# Patient Record
Sex: FEMALE | Race: OTHER | HISPANIC OR LATINO | Employment: FULL TIME | URBAN - METROPOLITAN AREA
[De-identification: names, ages, dates, MRNs, and addresses within clinical notes are randomized per-mention and may not be internally consistent; named-entity substitution may affect disease eponyms.]

---

## 2022-03-28 RX ORDER — LEVOTHYROXINE AND LIOTHYRONINE 38; 9 UG/1; UG/1
TABLET ORAL
COMMUNITY
Start: 2020-08-27

## 2022-04-06 ENCOUNTER — OFFICE VISIT (OUTPATIENT)
Dept: FAMILY MEDICINE CLINIC | Facility: CLINIC | Age: 42
End: 2022-04-06
Payer: COMMERCIAL

## 2022-04-06 VITALS
BODY MASS INDEX: 39.62 KG/M2 | DIASTOLIC BLOOD PRESSURE: 90 MMHG | WEIGHT: 237.8 LBS | TEMPERATURE: 96.9 F | RESPIRATION RATE: 16 BRPM | OXYGEN SATURATION: 98 % | HEIGHT: 65 IN | SYSTOLIC BLOOD PRESSURE: 116 MMHG | HEART RATE: 85 BPM

## 2022-04-06 DIAGNOSIS — F41.9 ANXIETY: ICD-10-CM

## 2022-04-06 DIAGNOSIS — E06.3 HASHIMOTO'S THYROIDITIS: Primary | ICD-10-CM

## 2022-04-06 DIAGNOSIS — Z12.31 ENCOUNTER FOR SCREENING MAMMOGRAM FOR BREAST CANCER: ICD-10-CM

## 2022-04-06 DIAGNOSIS — L63.9 ALOPECIA AREATA: ICD-10-CM

## 2022-04-06 DIAGNOSIS — R92.8 ABNORMAL MAMMOGRAM: ICD-10-CM

## 2022-04-06 DIAGNOSIS — E55.9 VITAMIN D DEFICIENCY: ICD-10-CM

## 2022-04-06 DIAGNOSIS — N76.0 BV (BACTERIAL VAGINOSIS): ICD-10-CM

## 2022-04-06 DIAGNOSIS — D50.9 IRON DEFICIENCY ANEMIA, UNSPECIFIED IRON DEFICIENCY ANEMIA TYPE: ICD-10-CM

## 2022-04-06 DIAGNOSIS — L21.9 SEBORRHEIC ECZEMA: ICD-10-CM

## 2022-04-06 DIAGNOSIS — B96.89 BV (BACTERIAL VAGINOSIS): ICD-10-CM

## 2022-04-06 PROBLEM — D64.9 ANEMIA: Status: ACTIVE | Noted: 2022-04-06

## 2022-04-06 PROCEDURE — 3008F BODY MASS INDEX DOCD: CPT | Performed by: FAMILY MEDICINE

## 2022-04-06 PROCEDURE — 1036F TOBACCO NON-USER: CPT | Performed by: FAMILY MEDICINE

## 2022-04-06 PROCEDURE — 3725F SCREEN DEPRESSION PERFORMED: CPT | Performed by: FAMILY MEDICINE

## 2022-04-06 PROCEDURE — 99204 OFFICE O/P NEW MOD 45 MIN: CPT | Performed by: FAMILY MEDICINE

## 2022-04-06 RX ORDER — ESTRADIOL 0.1 MG/D
PATCH, EXTENDED RELEASE TRANSDERMAL
COMMUNITY
Start: 2022-01-31

## 2022-04-06 RX ORDER — METRONIDAZOLE 7.5 MG/G
1 GEL VAGINAL AS NEEDED
Qty: 70 G | Refills: 2 | Status: SHIPPED | OUTPATIENT
Start: 2022-04-06

## 2022-04-06 RX ORDER — ESTRADIOL 2 MG/1
2 TABLET ORAL 2 TIMES DAILY
COMMUNITY
Start: 2022-02-16

## 2022-04-06 RX ORDER — SYRINGE-NEEDLE,INSULIN,0.5 ML 28GX1/2"
SYRINGE, EMPTY DISPOSABLE MISCELLANEOUS
COMMUNITY
Start: 2022-03-03

## 2022-04-06 RX ORDER — METRONIDAZOLE 7.5 MG/G
GEL VAGINAL
COMMUNITY
Start: 2022-03-11 | End: 2022-04-06 | Stop reason: SDUPTHER

## 2022-04-06 RX ORDER — FLUCONAZOLE 200 MG/1
TABLET ORAL
COMMUNITY
Start: 2022-02-16

## 2022-04-06 RX ORDER — FOLLITROPIN 975 [IU]/1.17ML
INJECTION, SOLUTION SUBCUTANEOUS
COMMUNITY
Start: 2022-04-04

## 2022-04-06 RX ORDER — MENOTROPINS 75 UNIT
KIT SUBCUTANEOUS
COMMUNITY
Start: 2022-04-04

## 2022-04-06 NOTE — PROGRESS NOTES
Assessment/Plan:       Problem List Items Addressed This Visit        Endocrine    Hashimoto's thyroiditis - Primary     On [place] thyroid  Does respond to the generic   Was seeing endo in [place]  Asked patient to upload records  Last TSH 1 05 in May 2021   Repeat ordered          Relevant Orders    TSH, 3rd generation       Musculoskeletal and Integument    Alopecia areata     Saw derm in the past and received intralesional injection in the past with minimal relief  Improved with stress relief         Seborrheic eczema    Relevant Medications    neomycin-polymyxin-hydrocortisone (CORTISPORIN) otic solution       Genitourinary    BV (bacterial vaginosis)     History of reccurent BV infections  Currently asx   Requesting MetroGel refill for PRN use          Relevant Medications    metroNIDAZOLE (METROGEL) 0 75 % vaginal gel       Other    Anemia    Relevant Orders    Iron Panel (Includes Ferritin, Iron Sat%, Iron, and TIBC)    CBC and differential    Vitamin B12    Vitamin D deficiency    Relevant Orders    Vitamin D 25 hydroxy    Encounter for screening mammogram for breast cancer    Relevant Orders    Mammo screening bilateral w 3d & cad    Abnormal mammogram     Requesting records  Diagnostic mammo and U S ordered   No personal or FH of breast cancer  There is history of colon cancer in a 2nd degree relative         Relevant Orders    US breast left limited (diagnostic)    US breast right limited (diagnostic)    Anxiety     Sees a therapist but would be interested in seeing someone else   Not interested in the pharmacotherapy  Has not tried medications in the past   Mood is stable and denies SI/HI  Prior [facility] admissions  Referred to Goldie Onofre          Relevant Orders    Ambulatory Referral to Behavioral Health            Subjective:      Patient ID: Jackelyn Hisrch is a 39 y o  female here as a new patient  PMH significant for recurrent BV, abnormal pap smear, anxiety, Hashimoto, and alopecia areata   Last PCP was in [facility] and was seen 2021  Here with anxiety, thyroid disease, abnormal mammogram, vitamin deficiency, and ear swelling  Weight gain: gained 30 lbs since last year  Last mammogram showed dense breast and recommended an US  Anxiety: Has been seeing a therapist to address her anxiety and for couples therapy  Currently seeing Anibal Murphy  Hashimoto's: On [place] thyroid  Synthroid didn't help  Was seeing endocrinologist in [facility]  Last TSH 2021 was 1 5  Antithyroglubulin negative and TPO was positive in   Vitamin deficiency: Deficient  in iron and Vit D  Last Hb13 1, MCV 82, plt 283, ferritin 9, iron 52, TIBC 361, iron sat 14, B12 1088,     Labs: Lipids in  chol 180, HDL 67, triglyceride 95, LDL 94    Started fertility journey in January  Undergoing IVF  History of trisomy pregnancy 2 years ago  Has a 16years old son  Left ear swollen on and off for 4 months  No drainage  Dry and itchy  Last pap smear in 2021  Cytology negative and no HPV  The following portions of the patient's history were reviewed and updated as appropriate:   She  has a past medical history of BV (bacterial vaginosis), Hashimoto's disease, Infertility, female, and Vitamin D deficiency  She   Patient Active Problem List    Diagnosis Date Noted    Encounter for screening mammogram for breast cancer 2022    Abnormal mammogram 2022    BV (bacterial vaginosis) 2022    Seborrheic eczema 2022    Anxiety 2022    Alopecia areata 2022    Anemia 2022    Vitamin D deficiency 2022    Hashimoto's thyroiditis 2011     She  has a past surgical history that includes [place] tooth extraction and  section  Her family history is not on file  She  reports that she has never smoked  She has never used smokeless tobacco  She reports current alcohol use  She reports that she does not use drugs    Current Outpatient Medications on File Prior to Visit   Medication Sig    Cholecalciferol (Vitamin D3) 1 25 MG (77886 UT) TABS     Chorionic Gonadotropin (PREGNYL IM) INJECT 25 UNITS SUBCUTANEOUSLY TWO TIMES DAILY    estradiol (ESTRACE) 2 MG tablet Take 2 mg by mouth 2 (two) times a day    fluconazole (DIFLUCAN) 200 mg tablet TAKE 1 TABLET BY MOUTH EVERY 72 HOURS    Follistim  UNT/1 08ML SOLN     Menopur 75 units SOLR     thyroid ([place] Thyroid) 60 MG tablet     TRUEplus Insulin Syringe 28G X 1/2" 0 5 ML MISC USE WITH MICRO LEUPROLIDE    Vivelle-Dot 0 1 MG/24HR      No current facility-administered medications on file prior to visit  She is allergic to almond (diagnostic) - food allergy       Review of Systems   Constitutional: Positive for fatigue  HENT: Positive for ear pain  Negative for ear discharge  Respiratory: Negative for choking, chest tightness and shortness of breath  Musculoskeletal: Negative for neck pain  Hematological: Negative for adenopathy  Psychiatric/Behavioral: Negative for self-injury and suicidal ideas  The patient is nervous/anxious  Objective:      /90   Pulse 85   Temp (!) 96 9 °F (36 1 °C)   Resp 16   Ht 5' 4 5" (1 638 m)   Wt 108 kg (237 lb 12 8 oz)   SpO2 98%   BMI 40 19 kg/m²          Physical Exam  Vitals reviewed  Constitutional:       Appearance: Normal appearance  She is not ill-appearing  HENT:      Head: Normocephalic and atraumatic  Right Ear: Tympanic membrane normal       Ears:      Comments: Dryness and flakes in the left ear canal  Mild redness   Eyes:      Extraocular Movements: Extraocular movements intact  Pupils: Pupils are equal, round, and reactive to light  Cardiovascular:      Rate and Rhythm: Normal rate and regular rhythm  Heart sounds: No murmur heard  Pulmonary:      Effort: Pulmonary effort is normal  No respiratory distress  Breath sounds: Normal breath sounds  No stridor  No wheezing or rhonchi     Abdominal: General: Abdomen is flat  There is no distension  Palpations: Abdomen is soft  There is no mass  Tenderness: There is no abdominal tenderness  Hernia: No hernia is present  Musculoskeletal:      Right lower leg: Edema present  Left lower leg: Edema (trace pretibial edema ) present  Skin:     General: Skin is warm  Coloration: Skin is not jaundiced  Neurological:      General: No focal deficit present  Mental Status: She is alert and oriented to person, place, and time  Psychiatric:         Mood and Affect: Mood normal          Behavior: Behavior normal          Thought Content:  Thought content normal

## 2022-04-07 PROBLEM — F41.9 ANXIETY: Status: ACTIVE | Noted: 2022-04-07

## 2022-04-07 PROBLEM — L21.9 SEBORRHEIC ECZEMA: Status: ACTIVE | Noted: 2022-04-07

## 2022-04-07 PROBLEM — R92.8 ABNORMAL MAMMOGRAM: Status: ACTIVE | Noted: 2022-04-07

## 2022-04-07 PROBLEM — B96.89 BV (BACTERIAL VAGINOSIS): Status: ACTIVE | Noted: 2022-04-07

## 2022-04-07 PROBLEM — Z12.31 ENCOUNTER FOR SCREENING MAMMOGRAM FOR BREAST CANCER: Status: ACTIVE | Noted: 2022-04-07

## 2022-04-07 PROBLEM — N76.0 BV (BACTERIAL VAGINOSIS): Status: ACTIVE | Noted: 2022-04-07

## 2022-04-07 NOTE — ASSESSMENT & PLAN NOTE
Saw derm in the past and received intralesional injection in the past with minimal relief     Improved with stress relief

## 2022-04-07 NOTE — ASSESSMENT & PLAN NOTE
Requesting records  Diagnostic mammo and U S ordered   No personal or FH of breast cancer   There is history of colon cancer in a 2nd degree relative

## 2022-04-07 NOTE — ASSESSMENT & PLAN NOTE
Sees a therapist but would be interested in seeing someone else   Not interested in the pharmacotherapy  Has not tried medications in the past   Mood is stable and denies SI/HI     Prior [facility] admissions  Referred to Bright Figueroa

## 2022-04-07 NOTE — ASSESSMENT & PLAN NOTE
On [place] thyroid  Does respond to the generic   [facility]  Asked patient to upload records     Last TSH 1 05 in May 2021   Repeat ordered

## 2022-05-11 ENCOUNTER — TELEPHONE (OUTPATIENT)
Dept: PSYCHIATRY | Facility: CLINIC | Age: 42
End: 2022-05-11

## 2022-05-11 NOTE — TELEPHONE ENCOUNTER
patient was not interested in scheduling at this time and states she will call when she chooses to do so

## 2022-09-19 ENCOUNTER — OFFICE VISIT (OUTPATIENT)
Dept: ENDOCRINOLOGY | Facility: CLINIC | Age: 42
End: 2022-09-19
Payer: COMMERCIAL

## 2022-09-19 VITALS
HEART RATE: 91 BPM | HEIGHT: 65 IN | WEIGHT: 238.6 LBS | SYSTOLIC BLOOD PRESSURE: 130 MMHG | BODY MASS INDEX: 39.75 KG/M2 | DIASTOLIC BLOOD PRESSURE: 90 MMHG

## 2022-09-19 DIAGNOSIS — E06.3 HASHIMOTO'S THYROIDITIS: ICD-10-CM

## 2022-09-19 DIAGNOSIS — E03.8 HYPOTHYROIDISM DUE TO HASHIMOTO'S THYROIDITIS: Primary | ICD-10-CM

## 2022-09-19 DIAGNOSIS — E04.1 THYROID NODULE: ICD-10-CM

## 2022-09-19 DIAGNOSIS — E55.9 VITAMIN D DEFICIENCY: ICD-10-CM

## 2022-09-19 DIAGNOSIS — Z3A.01 LESS THAN 8 WEEKS GESTATION OF PREGNANCY: ICD-10-CM

## 2022-09-19 DIAGNOSIS — E06.3 HYPOTHYROIDISM DUE TO HASHIMOTO'S THYROIDITIS: Primary | ICD-10-CM

## 2022-09-19 PROCEDURE — 99205 OFFICE O/P NEW HI 60 MIN: CPT | Performed by: INTERNAL MEDICINE

## 2022-09-19 RX ORDER — PROGESTERONE 50 MG/ML
75 INJECTION, SOLUTION INTRAMUSCULAR DAILY
COMMUNITY
Start: 2022-09-16 | End: 2022-10-12

## 2022-09-19 RX ORDER — NORETHINDRONE AND ETHINYL ESTRADIOL 1 MG-35MCG
KIT ORAL
COMMUNITY
Start: 2022-09-10 | End: 2022-09-19

## 2022-09-19 RX ORDER — LEVOTHYROXINE SODIUM 125 MCG
125 TABLET ORAL DAILY
Qty: 30 TABLET | Refills: 3 | Status: SHIPPED | OUTPATIENT
Start: 2022-09-19

## 2022-09-19 RX ORDER — ESTRADIOL 0.1 MG/G
2 CREAM VAGINAL DAILY
COMMUNITY
End: 2022-10-12

## 2022-09-19 NOTE — PROGRESS NOTES
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     TSH is optimal however would not suggest using [place] in pregnancy as T3 does not cross the placenta-fetal thyroid is not formed in the 1st trimester so relies on maternal T4  For now discontinue on no and start Synthroid 125 mcg daily  Repeat TSH and free T4 in 1 month  Relevant Medications    Synthroid 125 MCG tablet    Other Relevant Orders    T4, free Lab Collect    TSH, 3rd generation Lab Collect    Thyroid nodule     She gives history of having a thyroid nodule-did not require biopsy  Consider repeat imaging in the future         Relevant Medications    Synthroid 125 MCG tablet       Other    Less than 8 weeks gestation of pregnancy    Vitamin D deficiency     She is on Drisdol-suggest discussing with Dr Tami Menendez about continuing that versus changing to vitamin D3               CC:   Hypothyroidism    History of Present Illness     HPI:  26-year-old female referred here for evaluation of hypothyroidism  She has history of hypothyroidism due to Hashimoto thyroiditis and has been on armour 60 mg daily for 3 years     She is currently 6 weeks pregnant and  Underwent  IVF to achieve pregnancy  Menstrual cycles regular  prior to IVF  She had miscarriage in 2019 , normal healthy pregnancy at age 25   Trisomy 25 26    Takes Sutton regularly and properly - prenatal in afternoon     Feels well - had some bleeding Thursday and will be undergoing another ultrasound today     Some nausea , no vomiting , no fatigue     History of Hypothyroidism diagnosed in 2013 - initially on synthroid however switched to armour after 3 years - had symptoms of hair loss, fatigue while taking Synthroid felt better on Sutton   Weight gain - 25 lbs in the past year   No constipation     History of vitamin-D deficiency for which she is on Drisdol weekly     she works as   with Dr Lise Medley   Review of Systems    Historical Information   Past Medical History:   Diagnosis Date    BV (bacterial vaginosis)     Hashimoto's disease     Infertility, female     Vitamin D deficiency      Past Surgical History:   Procedure Laterality Date     SECTION      WISDOM TOOTH EXTRACTION       Social History   Social History     Substance and Sexual Activity   Alcohol Use Yes    Comment: socially     Social History     Substance and Sexual Activity   Drug Use Never     Social History     Tobacco Use   Smoking Status Never Smoker   Smokeless Tobacco Never Used     Family History:   Family History   Problem Relation Age of Onset    Thyroid disease unspecified Mother     Hyperlipidemia Father     Hypertension Father     Diabetes type II Paternal Grandfather        Meds/Allergies   Current Outpatient Medications   Medication Sig Dispense Refill    Cholecalciferol (Vitamin D3) 1 25 MG (41513 UT) TABS once a week      estradiol (ESTRACE VAGINAL) 0 1 mg/g vaginal cream Insert 2 g into the vagina daily      estradiol (ESTRACE) 2 MG tablet Take 2 mg by mouth 3 (three) times a day      fluconazole (DIFLUCAN) 200 mg tablet TAKE 1 TABLET BY MOUTH EVERY 72 HOURS      metroNIDAZOLE (METROGEL) 0 75 % vaginal gel Insert 1 application into the vagina as needed (discharge) use once daily during flares 70 g 2    neomycin-polymyxin-hydrocortisone (CORTISPORIN) otic solution Administer 3 drops into the left ear every 8 (eight) hours 10 mL 0    Prenatal Vit-Fe Fumarate-FA (PRENATAL VITAMIN PO) Take by mouth      progesterone 50 mg/mL injection 75 mg daily      Synthroid 125 MCG tablet Take 1 tablet (125 mcg total) by mouth daily 30 tablet 3    TRUEplus Insulin Syringe 28G X 1/2" 0 5 ML MISC USE WITH MICRO LEUPROLIDE       No current facility-administered medications for this visit  Allergies   Allergen Reactions    [place] (Diagnostic) - Food Allergy Itching and Other (See Comments)       Objective   Vitals: Blood pressure 130/90, pulse 91, height 5' 4 5" (1 638 m), weight 108 kg (238 lb 9 6 oz)  Physical Exam  Vitals reviewed  Constitutional:       Appearance: Normal appearance  She is obese  She is not ill-appearing or diaphoretic  HENT:      Head: Normocephalic and atraumatic  Eyes:      General: No scleral icterus  Extraocular Movements: Extraocular movements intact  Cardiovascular:      Rate and Rhythm: Normal rate and regular rhythm  Heart sounds: Normal heart sounds  No murmur heard  Pulmonary:      Effort: Pulmonary effort is normal  No respiratory distress  Breath sounds: Normal breath sounds  No wheezing  Abdominal:      General: There is no distension  Palpations: Abdomen is soft  Tenderness: There is no abdominal tenderness  There is no guarding  Musculoskeletal:      Cervical back: Neck supple  Right lower leg: No edema  Left lower leg: No edema  Lymphadenopathy:      Cervical: No cervical adenopathy  Skin:     General: Skin is warm and dry  Neurological:      General: No focal deficit present  Mental Status: She is alert and oriented to person, place, and time  Psychiatric:         Mood and Affect: Mood normal          Behavior: Behavior normal          Thought Content: Thought content normal          Judgment: Judgment normal          The history was obtained from the review of the chart, patient  Lab Results:       9/13/2022   tsh 1 78    Imaging Studies:     Portions of the record may have been created with voice recognition software  Occasional wrong word or "sound a like" substitutions may have occurred due to the inherent limitations of voice recognition software  Read the chart carefully and recognize, using context, where substitutions have occurred

## 2022-09-19 NOTE — ASSESSMENT & PLAN NOTE
She is on Drisdol-suggest discussing with Dr Trevin Edmonds about continuing that versus changing to vitamin D3

## 2022-09-19 NOTE — LETTER
September 19, 2022     Kristel Bennett MD  [address] [place] [id number]    Patient: Franco Le   YOB: 1980   Date of Visit: 9/19/2022       Dear Dr Jefferson Piedra:    Thank you for referring Franco Le to me for evaluation  Below are my notes for this consultation  If you have questions, please do not hesitate to call me  I look forward to following your patient along with you           Sincerely,        Tomasa Hamlin MD        CC: DO Tomasa Turcios MD  9/19/2022  9:29 AM  Incomplete   Franco Le 43 y o  female MRN: [id number]    Encounter: [id number]      Assessment/Plan     Problem List Items Addressed This Visit        Endocrine    Hashimoto's thyroiditis    Relevant Medications    Synthroid 125 MCG tablet    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    Relevant Medications    Synthroid 125 MCG tablet    Other Relevant Orders    T4, free Lab Collect    TSH, 3rd generation Lab Collect    Thyroid nodule    Relevant Medications    Synthroid 125 MCG tablet       Other    Less than 8 weeks gestation of pregnancy    Vitamin D deficiency          CC:   ***    History of Present Illness     HPI:    6 weeks pregnant Underwent  IVF   Menstrual cycles regular   miscarriage in 2019 , normal healthy pregnancy at age 25   Trisomy 25 2020   armour 60 mg daily for 3 years   Takes regularly and properly - prenatal in afternoon     Feels well - had some bleeding Thursday and will be undergoing another ultrasound today    with Dr Annalisa Jim   Some nausea , no vomiting , no fatigue     Hypothyroidism - 2013 - initially on synthroid however switched to armour after 3 years - had symptoms of hair loss, fatigue   Weight gain - 25 lbs in the past year   No constipation     DRISDOL weekly -       Review of Systems    Historical Information   Past Medical History:   Diagnosis Date    BV (bacterial vaginosis)     Hashimoto's disease     Infertility, female     Vitamin D deficiency      Past Surgical History:   Procedure Laterality Date     SECTION      WISDOM TOOTH EXTRACTION       Social History   Social History     Substance and Sexual Activity   Alcohol Use Yes    Comment: socially     Social History     Substance and Sexual Activity   Drug Use Never     Social History     Tobacco Use   Smoking Status Never Smoker   Smokeless Tobacco Never Used     Family History:   Family History   Problem Relation Age of Onset    Thyroid disease unspecified Mother     Hyperlipidemia Father     Hypertension Father     Diabetes type II Paternal Grandfather        Meds/Allergies   Current Outpatient Medications   Medication Sig Dispense Refill    Cholecalciferol (Vitamin D3) 1 25 MG (26660 UT) TABS once a week      estradiol (ESTRACE VAGINAL) 0 1 mg/g vaginal cream Insert 2 g into the vagina daily      estradiol (ESTRACE) 2 MG tablet Take 2 mg by mouth 3 (three) times a day      fluconazole (DIFLUCAN) 200 mg tablet TAKE 1 TABLET BY MOUTH EVERY 72 HOURS      metroNIDAZOLE (METROGEL) 0 75 % vaginal gel Insert 1 application into the vagina as needed (discharge) use once daily during flares 70 g 2    neomycin-polymyxin-hydrocortisone (CORTISPORIN) otic solution Administer 3 drops into the left ear every 8 (eight) hours 10 mL 0    Prenatal Vit-Fe Fumarate-FA (PRENATAL VITAMIN PO) Take by mouth      progesterone 50 mg/mL injection 75 mg daily      Synthroid 125 MCG tablet Take 1 tablet (125 mcg total) by mouth daily 30 tablet 3    TRUEplus Insulin Syringe 28G X 1/2" 0 5 ML MISC USE WITH MICRO LEUPROLIDE       No current facility-administered medications for this visit  Allergies   Allergen Reactions    [place] (Diagnostic) - Food Allergy Itching and Other (See Comments)       Objective   Vitals: Blood pressure 130/90, pulse 91, height 5' 4 5" (1 638 m), weight 108 kg (238 lb 9 6 oz)      Physical Exam    The history was obtained from the review of the chart, {HISTORY OBTAINED FROM:[id number]}  Lab Results:       9/13/2022   ths 1 78    Imaging Studies:       {Results Review Statement:43944}    Portions of the record may have been created with voice recognition software  Occasional wrong word or "sound a like" substitutions may have occurred due to the inherent limitations of voice recognition software  Read the chart carefully and recognize, using context, where substitutions have occurred

## 2022-09-19 NOTE — ASSESSMENT & PLAN NOTE
TSH is optimal however would not suggest using [place] in pregnancy as T3 does not cross the placenta-fetal thyroid is not formed in the 1st trimester so relies on maternal T4  For now discontinue on no and start Synthroid 125 mcg daily  Repeat TSH and free T4 in 1 month

## 2022-09-19 NOTE — LETTER
September 19, 2022     Anastasia Peralta MD  [address] [place] [id number]    Patient: Stewart Gruber   YOB: 1980   Date of Visit: 9/19/2022       Dear Dr Nadia Jensen:    Thank you for referring Stewart Coffee to me for evaluation  Below are my notes for this consultation  If you have questions, please do not hesitate to call me  I look forward to following your patient along with you  Sincerely,        Toñito Ramirez MD        CC: DO Toñito Beyer MD  9/19/2022  9:49 AM  Sign when Signing Visit   Stewart Gruber 43 y o  female MRN: [id number]    Encounter: [id number]      Assessment/Plan     Problem List Items Addressed This Visit        Endocrine    Hashimoto's thyroiditis    Relevant Medications    Synthroid 125 MCG tablet    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     TSH is optimal however would not suggest using [place] in pregnancy as T3 does not cross the placenta-fetal thyroid is not formed in the 1st trimester so relies on maternal T4  For now discontinue on no and start Synthroid 125 mcg daily  Repeat TSH and free T4 in 1 month  Relevant Medications    Synthroid 125 MCG tablet    Other Relevant Orders    T4, free Lab Collect    TSH, 3rd generation Lab Collect    Thyroid nodule     She gives history of having a thyroid nodule-did not require biopsy  Consider repeat imaging in the future         Relevant Medications    Synthroid 125 MCG tablet       Other    Less than 8 weeks gestation of pregnancy    Vitamin D deficiency     She is on Drisdol-suggest discussing with Dr Gaye Chamorro about continuing that versus changing to vitamin D3               CC:   Hypothyroidism    History of Present Illness     HPI:  27-year-old female referred here for evaluation of hypothyroidism  She has history of hypothyroidism due to Hashimoto thyroiditis and has been on armour 60 mg daily for 3 years     She is currently 6 weeks pregnant and Underwent  IVF to achieve pregnancy  Menstrual cycles regular  prior to IVF  She had miscarriage in 2019 , normal healthy pregnancy at age 25   Trisomy 25 26    Takes Vassar regularly and properly - prenatal in afternoon     Feels well - had some bleeding Thursday and will be undergoing another ultrasound today     Some nausea , no vomiting , no fatigue     History of Hypothyroidism diagnosed in  - initially on synthroid however switched to armour after 3 years - had symptoms of hair loss, fatigue while taking Synthroid felt better on Vassar   Weight gain - 25 lbs in the past year   No constipation     History of vitamin-D deficiency for which she is on Drisdol weekly     she works as   with Dr Raciel Lemus   Review of Systems    Historical Information   Past Medical History:   Diagnosis Date    BV (bacterial vaginosis)     Hashimoto's disease     Infertility, female     Vitamin D deficiency      Past Surgical History:   Procedure Laterality Date     SECTION      WISDOM TOOTH EXTRACTION       Social History   Social History     Substance and Sexual Activity   Alcohol Use Yes    Comment: socially     Social History     Substance and Sexual Activity   Drug Use Never     Social History     Tobacco Use   Smoking Status Never Smoker   Smokeless Tobacco Never Used     Family History:   Family History   Problem Relation Age of Onset    Thyroid disease unspecified Mother     Hyperlipidemia Father     Hypertension Father     Diabetes type II Paternal Grandfather        Meds/Allergies   Current Outpatient Medications   Medication Sig Dispense Refill    Cholecalciferol (Vitamin D3) 1 25 MG (69431 UT) TABS once a week      estradiol (ESTRACE VAGINAL) 0 1 mg/g vaginal cream Insert 2 g into the vagina daily      estradiol (ESTRACE) 2 MG tablet Take 2 mg by mouth 3 (three) times a day      fluconazole (DIFLUCAN) 200 mg tablet TAKE 1 TABLET BY MOUTH EVERY 72 HOURS      metroNIDAZOLE (METROGEL) 0 75 % vaginal gel Insert 1 application into the vagina as needed (discharge) use once daily during flares 70 g 2    neomycin-polymyxin-hydrocortisone (CORTISPORIN) otic solution Administer 3 drops into the left ear every 8 (eight) hours 10 mL 0    Prenatal Vit-Fe Fumarate-FA (PRENATAL VITAMIN PO) Take by mouth      progesterone 50 mg/mL injection 75 mg daily      Synthroid 125 MCG tablet Take 1 tablet (125 mcg total) by mouth daily 30 tablet 3    TRUEplus Insulin Syringe 28G X 1/2" 0 5 ML MISC USE WITH MICRO LEUPROLIDE       No current facility-administered medications for this visit  Allergies   Allergen Reactions    [place] ([facility]) - Food Allergy Itching and Other (See Comments)       Objective   Vitals: Blood pressure 130/90, pulse 91, height 5' 4 5" (1 638 m), weight 108 kg (238 lb 9 6 oz)  Physical Exam  Vitals reviewed  Constitutional:       Appearance: Normal appearance  She is obese  She is not ill-appearing or diaphoretic  HENT:      Head: Normocephalic and atraumatic  Eyes:      General: No scleral icterus  Extraocular Movements: Extraocular movements intact  Cardiovascular:      Rate and Rhythm: Normal rate and regular rhythm  Heart sounds: Normal heart sounds  No murmur heard  Pulmonary:      Effort: Pulmonary effort is normal  No respiratory distress  Breath sounds: Normal breath sounds  No wheezing  Abdominal:      General: There is no distension  Palpations: Abdomen is soft  Tenderness: There is no abdominal tenderness  There is no guarding  Musculoskeletal:      Cervical back: Neck supple  Right lower leg: No edema  Left lower leg: No edema  Lymphadenopathy:      Cervical: No cervical adenopathy  Skin:     General: Skin is warm and dry  Neurological:      General: No focal deficit present  Mental Status: She is alert and oriented to person, place, and time     Psychiatric:         Mood and Affect: Mood normal  Behavior: Behavior normal          Thought Content: Thought content normal          Judgment: Judgment normal          The history was obtained from the review of the chart, patient  Lab Results:       9/13/2022   tsh 1 78    Imaging Studies:     Portions of the record may have been created with voice recognition software  Occasional wrong word or "sound a like" substitutions may have occurred due to the inherent limitations of voice recognition software  Read the chart carefully and recognize, using context, where substitutions have occurred

## 2022-09-19 NOTE — ASSESSMENT & PLAN NOTE
She gives history of having a thyroid nodule-did not require biopsy    Consider repeat imaging in the future

## 2022-10-12 ENCOUNTER — TELEPHONE (OUTPATIENT)
Dept: ADMINISTRATIVE | Facility: OTHER | Age: 42
End: 2022-10-12

## 2022-10-12 ENCOUNTER — OFFICE VISIT (OUTPATIENT)
Dept: FAMILY MEDICINE CLINIC | Facility: CLINIC | Age: 42
End: 2022-10-12
Payer: COMMERCIAL

## 2022-10-12 VITALS
DIASTOLIC BLOOD PRESSURE: 78 MMHG | SYSTOLIC BLOOD PRESSURE: 110 MMHG | HEIGHT: 64 IN | TEMPERATURE: 96.3 F | WEIGHT: 233.8 LBS | RESPIRATION RATE: 16 BRPM | BODY MASS INDEX: 39.91 KG/M2 | OXYGEN SATURATION: 99 % | HEART RATE: 85 BPM

## 2022-10-12 DIAGNOSIS — Z11.4 SCREENING FOR HIV (HUMAN IMMUNODEFICIENCY VIRUS): ICD-10-CM

## 2022-10-12 DIAGNOSIS — Z00.00 ENCOUNTER FOR ANNUAL PHYSICAL EXAM: Primary | ICD-10-CM

## 2022-10-12 DIAGNOSIS — N97.9 INFERTILITY, FEMALE: ICD-10-CM

## 2022-10-12 DIAGNOSIS — J02.9 SORE THROAT: ICD-10-CM

## 2022-10-12 DIAGNOSIS — J06.9 VIRAL UPPER RESPIRATORY TRACT INFECTION: ICD-10-CM

## 2022-10-12 DIAGNOSIS — Z11.59 NEED FOR HEPATITIS C SCREENING TEST: ICD-10-CM

## 2022-10-12 LAB — S PYO AG THROAT QL: NEGATIVE

## 2022-10-12 PROCEDURE — 87880 STREP A ASSAY W/OPTIC: CPT | Performed by: FAMILY MEDICINE

## 2022-10-12 PROCEDURE — 99396 PREV VISIT EST AGE 40-64: CPT | Performed by: FAMILY MEDICINE

## 2022-10-12 PROCEDURE — 87070 CULTURE OTHR SPECIMN AEROBIC: CPT | Performed by: FAMILY MEDICINE

## 2022-10-12 NOTE — TELEPHONE ENCOUNTER
----- Message from Paige Simms sent at 10/12/2022  9:46 AM EDT -----  Regarding: care gap request/Pap smear  10/12/22 9:46 AM    Hello, our patient attached above has had Pap Smear (HPV) aka Cervical Cancer Screening completed/performed  Please assist in updating the patient chart by making an External outreach to [facility] facility located in [address] [place] [address] telephone number [phone]  The date of service is [id number]      Thank you,  Paige Simms  [facility] [place] [facility]

## 2022-10-12 NOTE — ASSESSMENT & PLAN NOTE
Here for AWV  Due for pap which she will schedule in dec  Plans to get the flu shot with her employer  Will defer Dtap  Started seeing a nutritionist  Encourage exercise   Plans to schedule nitesh and complete blood work

## 2022-10-12 NOTE — LETTER
Procedure Request Form: Cervical Cancer Screening      Date Requested: 10/12/22  Patient: Stewart Gruber  Patient : 1980   Referring Provider: Anastasia Peralta MD        Date of Procedure _______2019-2021 report_______________________       The above patient has informed us that they have completed their   most recent Cervical Cancer Screening at your facility  Please complete   this form and attach all corresponding procedure reports/results  Comments __________________________________________________________  ____________________________________________________________________  ____________________________________________________________________  ____________________________________________________________________    Facility Completing Procedure _________________________________________    Form Completed By (print name) _______________________________________      Signature __________________________________________________________      These reports are needed for  compliance  Please fax this completed form and a copy of the procedure report to our office located at [place] [address] as soon as possible to [phone] attention Anna Hebert: Phone [phone]    We thank you for your assistance in treating our mutual patient

## 2022-10-12 NOTE — ASSESSMENT & PLAN NOTE
Undergoing IVF at [facility]  Unfortunately miscarried  Plans to do another cycle  Will screen for HIV, GC, hep C, and RPR

## 2022-10-12 NOTE — TELEPHONE ENCOUNTER
Upon review of the In Basket request and the patient's chart, initial outreach has been made via fax, please see Contacts section for details       Thank you  Shahzad Das

## 2022-10-12 NOTE — PROGRESS NOTES
[facility]    NAME: Chasidy Lane  AGE: 43 y o  SEX: female  : 1980     DATE: 10/12/2022     Assessment and Plan:     Problem List Items Addressed This Visit        Respiratory    Viral upper respiratory tract infection     URI symptoms x 6 days   Tested negative for COVID today   Strept RADT negative  Throat cx ordered   Supportive care           Relevant Orders    POCT rapid strepA       Genitourinary    Infertility, female     Undergoing IVF at [facility]  Unfortunately miscarried  Plans to do another cycle  Will screen for HIV, GC, hep C, and RPR         Relevant Orders    RPR    Chlamydia/GC amplified DNA by PCR       Other    Need for hepatitis C screening test    Relevant Orders    Hepatitis C Antibody ([facility])    Screening for HIV (human immunodeficiency virus)    Relevant Orders    HIV 1/2 Antigen/Antibody (4th Generation) w Reflex [facility]    Encounter for annual physical exam - Primary     Here for AWV  Due for pap which she will schedule in dec  Plans to get the flu shot with her employer  Will defer Dtap  Started seeing a nutritionist  Encourage exercise  Plans to schedule nitesh and complete blood work            Other Visit Diagnoses     Sore throat        Relevant Orders    Throat culture          Immunizations and preventive care screenings were discussed with patient today  Appropriate education was printed on patient's after visit summary  Counseling:  Exercise: the importance of regular exercise/physical activity was discussed  Recommend exercise 3-5 times per week for at least 30 minutes  BMI Counseling: Body mass index is 40 13 kg/m²   The BMI is above normal  Nutrition recommendations include encouraging healthy choices of fruits and vegetables, decreasing fast food intake, limiting drinks that contain sugar, moderation in carbohydrate intake, increasing intake of lean protein, reducing intake of saturated and trans fat and reducing intake of cholesterol  Exercise recommendations include moderate physical activity 150 minutes/week  No pharmacotherapy was ordered  Rationale for BMI follow-up plan is due to patient being overweight or obese  Depression Screening and Follow-up Plan: Patient was screened for depression during today's encounter  They screened negative with a PHQ-2 score of 2  No follow-ups on file  Chief Complaint:     Chief Complaint   Patient presents with   • Physical Exam     Patient is here today for her annual exam       History of Present Illness:     Adult Annual Physical   Patient here for a comprehensive physical exam  The patient reports URI symptoms  Friday she woke up with a headache  Saturday she had sore throat  Sunday patient developed body ache  Monday had fever and chills  Did a home test this morning and came back negative  Unfortunately, patient miscarried after her last IVF cycle  May try another cycle  Met with endocrine and switched armour to synthroid  Diet and Physical Activity  Diet/Nutrition: Started seeing a nutritionist through [place]  Eatimg more protein rich foods, more vegetable, and less carbs   Exercise: no formal exercise  Depression Screening  PHQ-2/9 Depression Screening    Little interest or pleasure in doing things: 1 - several days  Feeling down, depressed, or hopeless: 1 - several days  PHQ-2 Score: 2  PHQ-2 Interpretation: Negative depression screen       General Health  Sleep: sleeps well  Hearing: normal - bilateral   Vision: no vision problems  Dental: regular dental visits and was seen last december 2021  /GYN Health  Patient is: premenopausal  Last menstrual period: 7/29/22  Contraceptive method: currently undergoing IVF   Review of Systems:     Review of Systems   Constitutional: Positive for fever (subjective )  HENT: Positive for congestion and sore throat  Respiratory: Negative  Cardiovascular: Negative  Neurological: Positive for headaches        Past Medical History:     Past Medical History:   Diagnosis Date   • BV (bacterial vaginosis)    • Hashimoto's disease    • Infertility, female    • Vitamin D deficiency       Past Surgical History:     Past Surgical History:   Procedure Laterality Date   •  SECTION     • WISDOM TOOTH EXTRACTION        Social History:     Social History     Socioeconomic History   • Marital status: Registered Domestic Partner     Spouse name: None   • Number of children: None   • Years of education: None   • Highest education level: None   Occupational History   • None   Tobacco Use   • Smoking status: Never Smoker   • Smokeless tobacco: Never Used   Substance and Sexual Activity   • Alcohol use: Yes     Comment: socially   • Drug use: Never   • Sexual activity: Yes     Partners: Male   Other Topics Concern   • None   Social History Narrative   • None     Social Determinants of Health     Financial Resource Strain: Not on file   Food Insecurity: Not on file   Transportation Needs: Not on file   Physical Activity: Not on file   Stress: Not on file   Social Connections: Not on file   Intimate Partner Violence: Not on file   Housing Stability: Not on file      Family History:     Family History   Problem Relation Age of Onset   • Thyroid disease unspecified Mother    • Hyperlipidemia Father    • Hypertension Father    • Diabetes type II Paternal Grandfather       Current Medications:     Current Outpatient Medications   Medication Sig Dispense Refill   • Cholecalciferol (Vitamin D3) 1 25 MG (16695 UT) TABS once a week     • fluconazole (DIFLUCAN) 200 mg tablet Take by mouth if needed     • metroNIDAZOLE (METROGEL) 0 75 % vaginal gel Insert 1 application into the vagina as needed (discharge) use once daily during flares 70 g 2   • neomycin-polymyxin-hydrocortisone (CORTISPORIN) otic solution Administer 3 drops into the left ear every 8 (eight) hours (Patient taking differently: Administer 3 drops into the left ear if needed) 10 mL 0   • Synthroid 125 MCG tablet Take 1 tablet (125 mcg total) by mouth daily 30 tablet 3     No current facility-administered medications for this visit  Allergies: Allergies   Allergen Reactions   • [place] (Diagn[facility]) - Food Allergy Itching and Other (See Comments)      Physical Exam:     /78 (BP Location: Left arm, Patient Position: Sitting, Cuff Size: Large)   Pulse 85   Temp (!) 96 3 °F (35 7 °C) (Tympanic)   Resp 16   Ht 5' 4" (1 626 m)   Wt 106 kg (233 lb 12 8 oz)   SpO2 99%   BMI 40 13 kg/m²     Physical Exam  Vitals reviewed  Constitutional:       General: She is not in acute distress  Appearance: Normal appearance  She is not ill-appearing  HENT:      Head: Normocephalic and atraumatic  Right Ear: Tympanic membrane normal       Left Ear: Tympanic membrane normal       Mouth/Throat:      Mouth: Mucous membranes are moist       Pharynx: Posterior oropharyngeal erythema present  No oropharyngeal exudate  Eyes:      Extraocular Movements: Extraocular movements intact  Cardiovascular:      Rate and Rhythm: Normal rate  Heart sounds: No murmur heard  Pulmonary:      Effort: Pulmonary effort is normal  No respiratory distress  Breath sounds: Normal breath sounds  Abdominal:      General: Abdomen is flat  There is no distension  Palpations: Abdomen is soft  There is no mass  Tenderness: There is no abdominal tenderness  There is no guarding or rebound  Hernia: No hernia is present  Lymphadenopathy:      Cervical: No cervical adenopathy  Skin:     General: Skin is warm  Neurological:      Mental Status: She is alert and oriented to person, place, and time     Psychiatric:         Mood and Affect: Mood normal          Behavior: Behavior normal           Julio Cesar Martini MD  [facility]

## 2022-10-12 NOTE — ASSESSMENT & PLAN NOTE
URI symptoms x 6 days   Tested negative for COVID today   Strept RADT negative  Throat cx ordered   Supportive care

## 2022-10-14 LAB — BACTERIA THROAT CULT: NORMAL

## 2022-10-17 ENCOUNTER — TELEPHONE (OUTPATIENT)
Dept: MAMMOGRAPHY | Facility: CLINIC | Age: 42
End: 2022-10-17

## 2022-10-17 NOTE — TELEPHONE ENCOUNTER
I telephoned Desirae Jones to inform her that we have not received her prior breast imaging from [place]  She stated that she had been recommended further imaging from her screening mammogram in July 2021  She agreed to call facility to try to expedite a disc coming to [facility]  I explained that disc will then be reviewed by our radiologist and we will call her to schedule appt for recommended imaging at [facility]  She verbalized understanding

## 2022-10-17 NOTE — TELEPHONE ENCOUNTER
Upon review of the In Basket request we were able to locate, review, and update the patient chart as requested for Pap Smear (HPV) aka Cervical Cancer Screening  Any additional questions or concerns should be emailed to the Practice Liaisons via the appropriate education email address, please do not reply via In Basket      Thank you  Asif Reyes

## 2022-10-18 ENCOUNTER — APPOINTMENT (OUTPATIENT)
Dept: LAB | Facility: CLINIC | Age: 42
End: 2022-10-18
Payer: COMMERCIAL

## 2022-10-18 DIAGNOSIS — N97.9 INFERTILITY, FEMALE: ICD-10-CM

## 2022-10-18 DIAGNOSIS — Z11.59 NEED FOR HEPATITIS C SCREENING TEST: ICD-10-CM

## 2022-10-18 DIAGNOSIS — Z11.4 SCREENING FOR HIV (HUMAN IMMUNODEFICIENCY VIRUS): ICD-10-CM

## 2022-10-18 PROCEDURE — 87491 CHLMYD TRACH DNA AMP PROBE: CPT

## 2022-10-18 PROCEDURE — 87389 HIV-1 AG W/HIV-1&-2 AB AG IA: CPT

## 2022-10-18 PROCEDURE — 86803 HEPATITIS C AB TEST: CPT

## 2022-10-18 PROCEDURE — 87591 N.GONORRHOEAE DNA AMP PROB: CPT

## 2022-10-18 PROCEDURE — 86592 SYPHILIS TEST NON-TREP QUAL: CPT

## 2022-10-18 PROCEDURE — 36415 COLL VENOUS BLD VENIPUNCTURE: CPT

## 2022-10-19 ENCOUNTER — TELEPHONE (OUTPATIENT)
Dept: FAMILY MEDICINE CLINIC | Facility: CLINIC | Age: 42
End: 2022-10-19

## 2022-10-19 LAB
C TRACH DNA SPEC QL NAA+PROBE: NEGATIVE
HCV AB SER QL: NORMAL
HIV 1+2 AB+HIV1 P24 AG SERPL QL IA: NORMAL
N GONORRHOEA DNA SPEC QL NAA+PROBE: NEGATIVE
RPR SER QL: NORMAL

## 2022-10-19 NOTE — TELEPHONE ENCOUNTER
Called [phone] was left on hold and could not get anyone on the phone  Reviewed the chart but we do not have the authority to request any medical information as no consent was signed   Patient has been advised via my chart

## 2022-12-07 ENCOUNTER — ANNUAL EXAM (OUTPATIENT)
Dept: FAMILY MEDICINE CLINIC | Facility: CLINIC | Age: 42
End: 2022-12-07

## 2022-12-07 VITALS
HEART RATE: 83 BPM | WEIGHT: 233.4 LBS | TEMPERATURE: 97.1 F | SYSTOLIC BLOOD PRESSURE: 120 MMHG | RESPIRATION RATE: 16 BRPM | HEIGHT: 64 IN | OXYGEN SATURATION: 97 % | DIASTOLIC BLOOD PRESSURE: 80 MMHG | BODY MASS INDEX: 39.85 KG/M2

## 2022-12-07 DIAGNOSIS — N76.0 BACTERIAL VAGINOSIS: ICD-10-CM

## 2022-12-07 DIAGNOSIS — B96.89 BACTERIAL VAGINOSIS: ICD-10-CM

## 2022-12-07 DIAGNOSIS — Z12.4 PAP SMEAR FOR CERVICAL CANCER SCREENING: Primary | ICD-10-CM

## 2022-12-07 DIAGNOSIS — B37.49 CANDIDIASIS OF GENITALIA: ICD-10-CM

## 2022-12-07 RX ORDER — FLUCONAZOLE 150 MG/1
150 TABLET ORAL ONCE
Qty: 1 TABLET | Refills: 1 | Status: SHIPPED | OUTPATIENT
Start: 2022-12-07 | End: 2022-12-07

## 2022-12-07 NOTE — PROGRESS NOTES
Annual GYN Examination  Hayden Noland  1980    Subjective      Hayden Noland is a 43 y o  female who presents for annual well woman exam  History of recurrent yeast infections    GYN:  Reports vaginal discharge, labial erythema or lesions, dyspareunia  Menarche at 5  Menses are regular, q 28 days, lasting 5 days  periods are regular  no unusual pelvic pain  has a history of previous abnormal Pap test ( HPV + 2017)  no family or personal history of cervical cancer  Contraception: none  Patient is sexually active with male partner  Gynecologic surgeries: Reports     OB:  [id number]  Pregnancies were complicated by pre-eclampsia  :  Denies dysuria, urinary frequency or urgency  Breast:  Denies breast mass, skin changes, dimpling, reddening, nipple retraction  Denies breast discharge  Patient does not have a family history of breast, endometrial, or ovarian cancer       Screening:  Cervical cancer: Last pap done 2021  Breast cancer: Ordered at the last visit but hasnt been done  Colon cancer: not indicated   STD screening: Very low risk of STD exposure and did a STI panel a few months ago   Last pap smear 2021       Review of Systems  Pertinent items are noted in HPI  Objective      /80 (BP Location: Left arm, Patient Position: Sitting, Cuff Size: Large)   Pulse 83   Temp (!) 97 1 °F (36 2 °C) (Skin)   Resp 16   Ht 5' 4" (1 626 m)   Wt 106 kg (233 lb 6 4 oz)   SpO2 97%   BMI 40 06 kg/m²     General:   alert, appears stated age and cooperative       Lungs: nl effort    Breasts: normal appearance, mass felt at the 3 o clock position of the right breast    Vulva: normal   Vagina: curdlike discharge, erythema around the labia                Assessment/Plan:     Diagnoses and all orders for this visit:    Pap smear for cervical cancer screening  Comments:  UTD for pap  Last one was done in 2021  Will care gap     Orders:  -     Cancel: Liquid-based pap, screening  -     Molecular Vaginal Panel; Future    Bacterial vaginosis  Comments:  Recurrent BV and yeast infection  Precipitated by sexual intercourse or menses  Molecular panel ordered  May need suppression tx  Rec boric acid supp for now  Orders:  -     Molecular Vaginal Panel; Future    Candidiasis of genitalia  -     fluconazole (DIFLUCAN) 150 mg tablet; Take 1 tablet (150 mg total) by mouth once for 1 dose      -      UTD for pap, done 7/2021  -      STI Screening: results reviewed by patient   -      GC/chlamydia swab collected today? No   -      Mammogram already ordered   -      Colonoscopy ordered today: Not Indicated  -      Health maintenance counseling performed on the following topics:   -      Regular exercise (at least 150 minutes of moderate aerobic activity per week or 75 minutes of vigorous aerobic activity per week) for CV & bone health    -      Diet well balanced diet to maintain a healthy weight  -      All questions have been answered to her satisfaction      CLEMENT Chicas

## 2022-12-08 LAB
C GLABRATA DNA VAG QL NAA+PROBE: NEGATIVE
C KRUSEI DNA VAG QL NAA+PROBE: NEGATIVE
CANDIDA SP 6 PNL VAG NAA+PROBE: POSITIVE
T VAGINALIS DNA VAG QL NAA+PROBE: NEGATIVE
VAGINOSIS/ITIS DNA PNL VAG PROBE+SIG AMP: NEGATIVE

## 2022-12-11 PROBLEM — J06.9 VIRAL UPPER RESPIRATORY TRACT INFECTION: Status: RESOLVED | Noted: 2022-10-12 | Resolved: 2022-12-11

## 2022-12-11 PROBLEM — Z11.59 NEED FOR HEPATITIS C SCREENING TEST: Status: RESOLVED | Noted: 2022-10-12 | Resolved: 2022-12-11

## 2023-02-05 ENCOUNTER — PATIENT MESSAGE (OUTPATIENT)
Dept: FAMILY MEDICINE CLINIC | Facility: CLINIC | Age: 43
End: 2023-02-05

## 2023-02-05 DIAGNOSIS — E78.2 MODERATE MIXED HYPERLIPIDEMIA NOT REQUIRING STATIN THERAPY: Primary | ICD-10-CM

## 2023-02-11 ENCOUNTER — PATIENT MESSAGE (OUTPATIENT)
Dept: FAMILY MEDICINE CLINIC | Facility: CLINIC | Age: 43
End: 2023-02-11

## 2023-02-11 DIAGNOSIS — E66.09 CLASS 2 OBESITY DUE TO EXCESS CALORIES WITHOUT SERIOUS COMORBIDITY WITH BODY MASS INDEX (BMI) OF 35.0 TO 35.9 IN ADULT: Primary | ICD-10-CM

## 2023-02-11 DIAGNOSIS — E78.2 MODERATE MIXED HYPERLIPIDEMIA NOT REQUIRING STATIN THERAPY: ICD-10-CM

## 2023-02-15 ENCOUNTER — TELEPHONE (OUTPATIENT)
Dept: ENDOCRINOLOGY | Facility: CLINIC | Age: 43
End: 2023-02-15

## 2023-02-15 NOTE — TELEPHONE ENCOUNTER
Patient called in asking if it is possible to get a later appt for today because she has a situation at work and will not be able to make her original appt  Please call pt to follow up

## 2023-02-16 ENCOUNTER — OFFICE VISIT (OUTPATIENT)
Dept: ENDOCRINOLOGY | Facility: CLINIC | Age: 43
End: 2023-02-16

## 2023-02-16 VITALS
HEIGHT: 64 IN | SYSTOLIC BLOOD PRESSURE: 132 MMHG | HEART RATE: 88 BPM | DIASTOLIC BLOOD PRESSURE: 80 MMHG | BODY MASS INDEX: 39.54 KG/M2 | WEIGHT: 231.6 LBS

## 2023-02-16 DIAGNOSIS — E66.9 OBESITY (BMI 30-39.9): ICD-10-CM

## 2023-02-16 DIAGNOSIS — E06.3 HYPOTHYROIDISM DUE TO HASHIMOTO'S THYROIDITIS: Primary | ICD-10-CM

## 2023-02-16 DIAGNOSIS — E03.8 HYPOTHYROIDISM DUE TO HASHIMOTO'S THYROIDITIS: Primary | ICD-10-CM

## 2023-02-16 DIAGNOSIS — R53.83 OTHER FATIGUE: ICD-10-CM

## 2023-02-16 DIAGNOSIS — R63.5 WEIGHT GAIN: ICD-10-CM

## 2023-02-16 RX ORDER — THYROID,PORK 90 MG
TABLET ORAL
COMMUNITY
Start: 2023-02-08

## 2023-02-16 RX ORDER — LEVOTHYROXINE SODIUM 150 MCG
150 TABLET ORAL DAILY
Qty: 90 TABLET | Refills: 2 | Status: SHIPPED | OUTPATIENT
Start: 2023-02-16 | End: 2023-02-17

## 2023-02-16 NOTE — ASSESSMENT & PLAN NOTE
Order for vitamin D and iron panel placed   Recommend increasing Synthroid to 150 mcg and rechecking thyroid function test in 6 weeks

## 2023-02-16 NOTE — ASSESSMENT & PLAN NOTE
TSH is elevated  Recommend increase to 150 mcg Synthroid  Requires brand name   Repeat TSH and T4 in 6 weeks

## 2023-02-16 NOTE — PROGRESS NOTES
Established Patient Progress Note    CC: Follow up for Hashimoto's thyroiditis    History of Present Illness:   Matthew Trevizo is a 43 y o  female with Hashimoto's thyroiditis  She is currently taking 125 mcg of Synthroid daily  She requires brand levothyroxine  She reports inability losing weight  She admits to not exercising and eating as well as she should  She also reports fatigue, hair loss and feeling down  She does not have any wish to harm herself  She is speaking to a therapist  She states she has experienced a lot of grief in her life  She recently had a miscarriage in September  Her TSH is elevated on recent lab work  There was no T4 drawn  She was previously on [place] Thyroid and felt well on this in the past  She has not resumed taking this  For the fatigue, the patient states she was previously on iron and vitamin D supplements which she has stopped taking  She states she was unable to tolerate the iron liquid or tablet  Patient Active Problem List   Diagnosis   • Alopecia areata   • Anemia   • Hashimoto's thyroiditis   • Vitamin D deficiency   • Encounter for screening mammogram for breast cancer   • Abnormal mammogram   • BV (bacterial vaginosis)   • Seborrheic eczema   • Anxiety   • Hypothyroidism due to Hashimoto's thyroiditis   • Less than 8 weeks gestation of pregnancy   • Thyroid nodule   • Screening for HIV (human immunodeficiency virus)   • Infertility, female   • Encounter for annual physical exam   • Other fatigue   • Obesity (BMI 30-39  9)      Past Medical History:   Diagnosis Date   • BV (bacterial vaginosis)    • Hashimoto's disease    • Infertility, female    • Vitamin D deficiency       Past Surgical History:   Procedure Laterality Date   •  SECTION     • WISDOM TOOTH EXTRACTION        Family History   Problem Relation Age of Onset   • Thyroid disease unspecified Mother    • Hyperlipidemia Father    • Hypertension Father    • Diabetes type II Paternal Grandfather Social History     Tobacco Use   • Smoking status: Never   • Smokeless tobacco: Never   Substance Use Topics   • Alcohol use: Yes     Comment: socially     Allergies   Allergen Reactions   • [place] (Diagnostic) - Food Allergy Itching and Other (See Comments)         Current Outpatient Medications:   •  fluconazole (DIFLUCAN) 200 mg tablet, Take by mouth if needed, Disp: , Rfl:   •  metroNIDAZOLE (METROGEL) 0 75 % vaginal gel, Insert 1 application into the vagina as needed (discharge) use once daily during flares, Disp: 70 g, Rfl: 2  •  neomycin-polymyxin-hydrocortisone (CORTISPORIN) otic solution, Administer 3 drops into the left ear every 8 (eight) hours (Patient taking differently: Administer 3 drops into the left ear if needed), Disp: 10 mL, Rfl: 0  •  Synthroid 150 MCG tablet, Take 1 tablet (150 mcg total) by mouth daily, Disp: 90 tablet, Rfl: 2  •  [place] Thyroid 90 MG tablet, , Disp: , Rfl:   •  Cholecalciferol (Vitamin D3) 1 25 MG (95460 UT) TABS, once a week (Patient not taking: Reported on 2/16/2023), Disp: , Rfl:   •  liraglutide (SAXENDA) injection, Inject 0 1 mL (0 6 mg total) under the skin daily Take 0 6 mg daily and increase by 0 6 mg weekly  (Patient not taking: Reported on 2/16/2023), Disp: 6 mL, Rfl: 0    Review of Systems   Constitutional: Positive for fatigue  Negative for chills and fever  HENT: Negative for ear pain and sore throat  Eyes: Negative for pain and visual disturbance  Respiratory: Negative for cough and shortness of breath  Cardiovascular: Negative for chest pain and palpitations  Gastrointestinal: Negative for abdominal pain and vomiting  Genitourinary: Negative for dysuria and hematuria  Musculoskeletal: Negative for arthralgias and back pain  Skin: Negative for color change and rash  Neurological: Negative for seizures and syncope  All other systems reviewed and are negative  Physical Exam:  Body mass index is 39 75 kg/m²    /80   Pulse 88 Ht 5' 4" (1 626 m)   Wt 105 kg (231 lb 9 6 oz)   BMI 39 75 kg/m²    Wt Readings from Last 3 Encounters:   02/16/23 105 kg (231 lb 9 6 oz)   12/07/22 106 kg (233 lb 6 4 oz)   10/12/22 106 kg (233 lb 12 8 oz)       Physical Exam  Vitals reviewed  Constitutional:       Appearance: Normal appearance  HENT:      Head: Normocephalic and atraumatic  Eyes:      Pupils: Pupils are equal, round, and reactive to light  Neck:      Thyroid: No thyroid mass, thyromegaly or thyroid tenderness  Cardiovascular:      Rate and Rhythm: Normal rate and regular rhythm  Heart sounds: Normal heart sounds  Pulmonary:      Effort: Pulmonary effort is normal       Breath sounds: Normal breath sounds  Musculoskeletal:      Cervical back: Neck supple  No rigidity or tenderness  No pain with movement  Lymphadenopathy:      Cervical: No cervical adenopathy  Right cervical: No superficial or deep cervical adenopathy  Left cervical: No superficial or deep cervical adenopathy  Skin:     General: Skin is warm and dry  Neurological:      Mental Status: She is alert and oriented to person, place, and time  Psychiatric:         Mood and Affect: Mood normal          Behavior: Behavior normal          Thought Content: Thought content normal          Labs:   No results found for: HGBA1C  No results found for: CREATININE, BUN, NA, K, CL, CO2  No results found for: EGFR  No results found for: CHOL, HDL, LDL, TRIG, CHOLHDL  No results found for: ALT, AST, GGT, ALKPHOS, BILITOT  No results found for: [id number]  No results found for: FREET4, TSI    Impression & Plan:    Problem List Items Addressed This Visit        Endocrine    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     TSH is elevated  Recommend increase to 150 mcg Synthroid  Requires brand name   Repeat TSH and T4 in 6 weeks         Relevant Medications    [place] Thyroid 90 MG tablet    Synthroid 150 MCG tablet    Other Relevant Orders    TSH, 3rd generation Lab Collect    T4, free Lab Collect       Other    Other fatigue     Order for vitamin D and iron panel placed  Recommend increasing Synthroid to 150 mcg and rechecking thyroid function test in 6 weeks         Relevant Orders    Vitamin D 25 hydroxy Lab Collect    Iron Panel (Includes Ferritin, Iron Sat%, Iron, and TIBC)    TSH, 3rd generation Lab Collect    T4, free Lab Collect    Obesity (BMI 30-39  9)     Referral to weight management placed        Other Visit Diagnoses     Weight gain        Relevant Orders    Ambulatory Referral to Weight Management          Orders Placed This Encounter   Procedures   • Vitamin D 25 hydroxy Lab Collect     Standing Status:   Future     Standing Expiration Date:   2/16/2024   • TSH, 3rd generation Lab Collect     This is a patient instruction: This test is non-fasting  Please drink two glasses of water morning of bloodwork  Standing Status:   Future     Standing Expiration Date:   2/16/2024   • T4, free Lab Collect     Standing Status:   Future     Standing Expiration Date:   2/16/2024   • Ambulatory Referral to Weight Management     Standing Status:   Future     Standing Expiration Date:   2/16/2024     Referral Priority:   Routine     Referral Type:   Consult - AMB     Referral Reason:   Specialty Services Required     Requested Specialty:   Bariatrics     Number of Visits Requested:   1     Expiration Date:   2/16/2024       There are no Patient Instructions on file for this visit  Discussed with the patient and all questioned fully answered  She will call me if any problems arise

## 2023-02-17 DIAGNOSIS — E06.3 HYPOTHYROIDISM DUE TO HASHIMOTO'S THYROIDITIS: ICD-10-CM

## 2023-02-17 DIAGNOSIS — E03.8 HYPOTHYROIDISM DUE TO HASHIMOTO'S THYROIDITIS: ICD-10-CM

## 2023-02-17 RX ORDER — LEVOTHYROXINE SODIUM 150 MCG
150 TABLET ORAL DAILY
Qty: 31 TABLET | Refills: 2 | Status: SHIPPED | OUTPATIENT
Start: 2023-02-17

## 2023-04-14 DIAGNOSIS — D50.9 IRON DEFICIENCY ANEMIA, UNSPECIFIED IRON DEFICIENCY ANEMIA TYPE: Primary | ICD-10-CM

## 2023-04-27 ENCOUNTER — CONSULT (OUTPATIENT)
Dept: HEMATOLOGY ONCOLOGY | Facility: CLINIC | Age: 43
End: 2023-04-27

## 2023-04-27 ENCOUNTER — TELEPHONE (OUTPATIENT)
Dept: HEMATOLOGY ONCOLOGY | Facility: CLINIC | Age: 43
End: 2023-04-27

## 2023-04-27 VITALS
SYSTOLIC BLOOD PRESSURE: 118 MMHG | TEMPERATURE: 97.9 F | HEART RATE: 79 BPM | DIASTOLIC BLOOD PRESSURE: 68 MMHG | WEIGHT: 221.5 LBS | OXYGEN SATURATION: 98 % | RESPIRATION RATE: 18 BRPM | BODY MASS INDEX: 37.81 KG/M2 | HEIGHT: 64 IN

## 2023-04-27 DIAGNOSIS — D50.9 IRON DEFICIENCY ANEMIA, UNSPECIFIED IRON DEFICIENCY ANEMIA TYPE: Primary | ICD-10-CM

## 2023-04-27 DIAGNOSIS — E63.9 NUTRITIONAL DEFICIENCY: ICD-10-CM

## 2023-04-27 RX ORDER — SODIUM CHLORIDE 9 MG/ML
20 INJECTION, SOLUTION INTRAVENOUS ONCE
Status: CANCELLED | OUTPATIENT
Start: 2023-05-03

## 2023-04-27 NOTE — PROGRESS NOTES
Oncology Outpatient Consult Note  Ry Dozier 43 y o  female MRN: @ Encounter: [id number]        Date:  4/27/2023        CC:  Iron deficiency anemia      HPI:  Ry Dozier is seen for initial consultation 4/27/2023 regarding iron deficiency anemia  Patient has PMH of Hashimoto's thyroiditis  Patient recently moved to [place] from [place] in 2021  Patient reports she was found to be anemic with a very low ferritin in 2008, she was placed on oral iron supplements at that time  She stopped taking the supplements due to GI discomfort  Patient has one child born in 2005  Prior to pregnancy, her periods lasted 5 days, with days 1-3 the heaviest   Currently, her periods last about 7 days, 4 days of which are heavy  Patient is c/o of extreme fatigue, hair loss and HAs  Denies PICA, RLS, lightheadedness  Patient is not a vegetarian/vegan  She is a non-smoker  Drinks alcohol socially  Most recent iron panel on 4/2023: serum iron 25, % saturation 6, Ferritin 12  Most recent CBC 1/2022: Hgb 10 1, MCV 83, otherwise normal CBC  Bili 0 4, no renal insufficiency noted  ROS: As stated in history of present illness otherwise her 14 point review of systems today was negative  ECOG PS: 0    Test Results:    Imaging: No results found      Labs: No results found for: WBC, HGB, HCT, MCV, PLT  No results found for: NA, K, CL, CO2, ANIONGAP, BUN, CREATININE, GLUCOSE, GLUF, CALCIUM, CORRECTEDCA, AST, ALT, ALKPHOS, PROT, BILITOT, EGFR        Lab Results   Component Value Date    IRON 25 (L) 04/13/2023    TIBC 420 04/13/2023    FERRITIN 12 (L) 04/13/2023           Active Problems:   Patient Active Problem List   Diagnosis   • Alopecia areata   • Anemia   • Hashimoto's thyroiditis   • Vitamin D deficiency   • Encounter for screening mammogram for breast cancer   • Abnormal mammogram   • BV (bacterial vaginosis)   • Seborrheic eczema   • Anxiety   • Hypothyroidism due to Hashimoto's thyroiditis   • Less than 8 weeks gestation of pregnancy   • Thyroid nodule   • Screening for HIV (human immunodeficiency virus)   • Infertility, female   • Encounter for annual physical exam   • Other fatigue   • Obesity (BMI 30-39  9)   • Iron deficiency anemia       Past Medical History:   Past Medical History:   Diagnosis Date   • BV (bacterial vaginosis)    • Hashimoto's disease    • Infertility, female    • Vitamin D deficiency        Surgical History:   Past Surgical History:   Procedure Laterality Date   •  SECTION     • WISDOM TOOTH EXTRACTION         Family History:    Family History   Problem Relation Age of Onset   • Thyroid disease unspecified Mother    • Hyperlipidemia Father    • Hypertension Father    • Diabetes type II Paternal Grandfather        Cancer-related family history is not on file      Social History:   Social History     Socioeconomic History   • Marital status: Registered Domestic Partner     Spouse name: Not on file   • Number of children: Not on file   • Years of education: Not on file   • Highest education level: Not on file   Occupational History   • Not on file   Tobacco Use   • Smoking status: Never   • Smokeless tobacco: Never   Substance and Sexual Activity   • Alcohol use: Yes     Comment: socially   • Drug use: Never   • Sexual activity: Yes     Partners: Male   Other Topics Concern   • Not on file   Social History Narrative   • Not on file     Social Determinants of Health     Financial Resource Strain: Not on file   Food Insecurity: Not on file   Transportation Needs: Not on file   Physical Activity: Not on file   Stress: Not on file   Social Connections: Not on file   Intimate Partner Violence: Not on file   Housing Stability: Not on file       Current Medications:   Current Outpatient Medications   Medication Sig Dispense Refill   • Cholecalciferol (Vitamin D3) 1 25 MG (66352 UT) TABS once a week     • fluconazole (DIFLUCAN) 200 mg tablet Take by mouth if needed     • metroNIDAZOLE (METROGEL) 0 75 % vaginal gel Insert 1 application into the vagina as needed (discharge) use once daily during flares 70 g 2   • neomycin-polymyxin-hydrocortisone (CORTISPORIN) otic solution Administer 3 drops into the left ear every 8 (eight) hours (Patient taking differently: Administer 3 drops into the left ear if needed) 10 mL 0   • Synthroid 150 MCG tablet Take 1 tablet (150 mcg total) by mouth daily 31 tablet 2   • [place] Thyroid 90 MG tablet  (Patient not taking: Reported on 2/16/2023)     • liraglutide (SAXENDA) injection Inject 0 1 mL (0 6 mg total) under the skin daily Take 0 6 mg daily and increase by 0 6 mg weekly  (Patient not taking: Reported on 4/27/2023) 6 mL 0     No current facility-administered medications for this visit  Allergies: Allergies   Allergen Reactions   • [place] (Diagnostic) - Food Allergy Itching and Other (See Comments)         Physical Exam:    Body surface area is 2 04 meters squared  Wt Readings from Last 3 Encounters:   04/27/23 100 kg (221 lb 8 oz)   02/16/23 105 kg (231 lb 9 6 oz)   12/07/22 106 kg (233 lb 6 4 oz)        Temp Readings from Last 3 Encounters:   04/27/23 97 9 °F (36 6 °C) (Tympanic)   12/07/22 (!) 97 1 °F (36 2 °C) (Skin)   10/12/22 (!) 96 3 °F (35 7 °C) (Tympanic)        BP Readings from Last 3 Encounters:   04/27/23 118/68   02/16/23 132/80   12/07/22 120/80         Pulse Readings from Last 3 Encounters:   04/27/23 79   02/16/23 88   12/07/22 83     @LASTSAO2(3)@    Physical Exam     Constitutional   General appearance: No acute distress, well appearing and well nourished  Eyes   Conjunctiva and lids: No swelling, erythema or discharge  Pupils and irises: Equal, round and reactive to light  Ears, Nose, Mouth, and Throat   External inspection of ears and nose: Normal     Nasal mucosa, septum, and turbinates: Normal without edema or erythema  Oropharynx: Normal with no erythema, edema, exudate or lesions      Pulmonary   Respiratory effort: No increased work of breathing or signs of respiratory distress  Auscultation of lungs: Clear to auscultation  Cardiovascular   Palpation of heart: Normal PMI, no thrills  Auscultation of heart: Normal rate and rhythm, normal S1 and S2, without murmurs  Examination of extremities for edema and/or varicosities: Normal     Carotid pulses: Normal     Abdomen   Abdomen: Non-tender, no masses  Liver and spleen: No hepatomegaly or splenomegaly  Lymphatic   Palpation of lymph nodes in neck: No lymphadenopathy  Musculoskeletal   Gait and station: Normal     Digits and nails: Normal without clubbing or cyanosis  Inspection/palpation of joints, bones, and muscles: Normal     Skin   Skin and subcutaneous tissue: Normal without rashes or lesions  Neurologic   Cranial nerves: Cranial nerves 2-12 intact  Sensation: No sensory loss  Psychiatric   Orientation to person, place, and time: Normal     Mood and affect: Normal           Assessment/ Plan:   Discussed possible etiologies such as blood loss, iron deficiency, nutritional deficiencies, hemolysis or sequestration  Discussed her iron deficiency anemia likely due to menorrhagia and malabsorption  Patient would benefit from IV iron infusions Venofer 300 mg x 4 doses as she is not able to tolerate oral iron supplements  Patient educated about the iron product, administration and common adverse effects including but not limited to: Anaphylaxis/allergic reaction, arthralgias/myalgias, nausea; agrees to proceed with treatment  We will see patient back in the office in 3 months with labs prior (CBC, Iron Panel, Vit B12, Folate)  Patient agreeable to plan  She is aware to contact us should she have any additional questions/concerns  I spent 60 minutes in chart review, face to face counseling, coordination of care, and documentation

## 2023-05-02 ENCOUNTER — TELEPHONE (OUTPATIENT)
Dept: INFUSION CENTER | Facility: CLINIC | Age: 43
End: 2023-05-02

## 2023-05-02 NOTE — TELEPHONE ENCOUNTER
Patient called and reminded of appointment for Zach on Wednesday, May 3, 2023  Informed patient of infusion center location, visitor policy, snacks, wifi, and answered questions related to medication  Patient verbally confirmed appointment

## 2023-05-03 ENCOUNTER — HOSPITAL ENCOUNTER (OUTPATIENT)
Dept: INFUSION CENTER | Facility: CLINIC | Age: 43
Discharge: HOME | End: 2023-05-03

## 2023-05-03 DIAGNOSIS — D50.9 IRON DEFICIENCY ANEMIA, UNSPECIFIED IRON DEFICIENCY ANEMIA TYPE: Primary | ICD-10-CM

## 2023-05-03 RX ORDER — SODIUM CHLORIDE 9 MG/ML
20 INJECTION, SOLUTION INTRAVENOUS ONCE
Status: COMPLETED | OUTPATIENT
Start: 2023-05-03 | End: 2023-05-03

## 2023-05-03 RX ORDER — SODIUM CHLORIDE 9 MG/ML
20 INJECTION, SOLUTION INTRAVENOUS ONCE
Status: CANCELLED | OUTPATIENT
Start: 2023-05-10

## 2023-05-03 RX ADMIN — IRON SUCROSE 300 MG: 20 INJECTION, SOLUTION INTRAVENOUS at 14:16

## 2023-05-03 RX ADMIN — SODIUM CHLORIDE 20 ML/HR: 0.9 INJECTION, SOLUTION INTRAVENOUS at 14:15

## 2023-05-08 ENCOUNTER — TELEPHONE (OUTPATIENT)
Dept: UROLOGY | Facility: MEDICAL CENTER | Age: 43
End: 2023-05-08

## 2023-05-08 ENCOUNTER — APPOINTMENT (EMERGENCY)
Dept: CT IMAGING | Facility: HOSPITAL | Age: 43
End: 2023-05-08

## 2023-05-08 ENCOUNTER — HOSPITAL ENCOUNTER (EMERGENCY)
Facility: HOSPITAL | Age: 43
Discharge: HOME | End: 2023-05-08
Attending: EMERGENCY MEDICINE

## 2023-05-08 VITALS
DIASTOLIC BLOOD PRESSURE: 70 MMHG | RESPIRATION RATE: 18 BRPM | TEMPERATURE: 97.8 F | HEART RATE: 66 BPM | SYSTOLIC BLOOD PRESSURE: 126 MMHG | OXYGEN SATURATION: 100 %

## 2023-05-08 DIAGNOSIS — N20.1 RIGHT URETERAL STONE: Primary | ICD-10-CM

## 2023-05-08 DIAGNOSIS — N13.30 HYDRONEPHROSIS: ICD-10-CM

## 2023-05-08 LAB
ANION GAP SERPL CALCULATED.3IONS-SCNC: 8 MMOL/L (ref 4–13)
BACTERIA UR QL AUTO: ABNORMAL /HPF
BASOPHILS # BLD AUTO: 0.04 THOUSANDS/ÂΜL (ref 0–0.1)
BASOPHILS NFR BLD AUTO: 0 % (ref 0–1)
BILIRUB UR QL STRIP: NEGATIVE
BUN SERPL-MCNC: 9 MG/DL (ref 5–25)
CALCIUM SERPL-MCNC: 9 MG/DL (ref 8.4–10.2)
CHLORIDE SERPL-SCNC: 104 MMOL/L (ref 96–108)
CLARITY UR: CLEAR
CO2 SERPL-SCNC: 25 MMOL/L (ref 21–32)
COLOR UR: YELLOW
CREAT SERPL-MCNC: 0.93 MG/DL (ref 0.6–1.3)
EOSINOPHIL # BLD AUTO: 0.09 THOUSAND/ÂΜL (ref 0–0.61)
EOSINOPHIL NFR BLD AUTO: 1 % (ref 0–6)
ERYTHROCYTE [DISTWIDTH] IN BLOOD BY AUTOMATED COUNT: 14.2 % (ref 11.6–15.1)
GFR SERPL CREATININE-BSD FRML MDRD: 76 ML/MIN/1.73SQ M
GLUCOSE SERPL-MCNC: 111 MG/DL (ref 65–140)
GLUCOSE UR STRIP-MCNC: NEGATIVE MG/DL
HCG SERPL QL: NEGATIVE
HCT VFR BLD AUTO: 38.4 % (ref 34.8–46.1)
HGB BLD-MCNC: 12.1 G/DL (ref 11.5–15.4)
HGB UR QL STRIP.AUTO: ABNORMAL
IMM GRANULOCYTES # BLD AUTO: 0.04 THOUSAND/UL (ref 0–0.2)
IMM GRANULOCYTES NFR BLD AUTO: 0 % (ref 0–2)
KETONES UR STRIP-MCNC: ABNORMAL MG/DL
LEUKOCYTE ESTERASE UR QL STRIP: NEGATIVE
LYMPHOCYTES # BLD AUTO: 1.41 THOUSANDS/ÂΜL (ref 0.6–4.47)
LYMPHOCYTES NFR BLD AUTO: 14 % (ref 14–44)
MCH RBC QN AUTO: 25.7 PG (ref 26.8–34.3)
MCHC RBC AUTO-ENTMCNC: 31.5 G/DL (ref 31.4–37.4)
MCV RBC AUTO: 82 FL (ref 82–98)
MONOCYTES # BLD AUTO: 0.52 THOUSAND/ÂΜL (ref 0.17–1.22)
MONOCYTES NFR BLD AUTO: 5 % (ref 4–12)
MUCOUS THREADS UR QL AUTO: ABNORMAL
NEUTROPHILS # BLD AUTO: 8.3 THOUSANDS/ÂΜL (ref 1.85–7.62)
NEUTS SEG NFR BLD AUTO: 80 % (ref 43–75)
NITRITE UR QL STRIP: NEGATIVE
NON-SQ EPI CELLS URNS QL MICRO: ABNORMAL /HPF
NRBC BLD AUTO-RTO: 0 /100 WBCS
PH UR STRIP.AUTO: 5.5 [PH]
PLATELET # BLD AUTO: 292 THOUSANDS/UL (ref 149–390)
PMV BLD AUTO: 10.1 FL (ref 8.9–12.7)
POTASSIUM SERPL-SCNC: 3.4 MMOL/L (ref 3.5–5.3)
PROT UR STRIP-MCNC: NEGATIVE MG/DL
RBC # BLD AUTO: 4.7 MILLION/UL (ref 3.81–5.12)
RBC #/AREA URNS AUTO: ABNORMAL /HPF
SODIUM SERPL-SCNC: 137 MMOL/L (ref 135–147)
SP GR UR STRIP.AUTO: 1.02 (ref 1–1.03)
UROBILINOGEN UR QL STRIP.AUTO: 0.2 E.U./DL
WBC # BLD AUTO: 10.4 THOUSAND/UL (ref 4.31–10.16)
WBC #/AREA URNS AUTO: ABNORMAL /HPF

## 2023-05-08 RX ORDER — ONDANSETRON 4 MG/1
4 TABLET, ORALLY DISINTEGRATING ORAL EVERY 8 HOURS PRN
Qty: 12 TABLET | Refills: 0 | Status: SHIPPED | OUTPATIENT
Start: 2023-05-08

## 2023-05-08 RX ORDER — OXYCODONE HYDROCHLORIDE AND ACETAMINOPHEN 5; 325 MG/1; MG/1
1 TABLET ORAL EVERY 4 HOURS PRN
Qty: 10 TABLET | Refills: 0 | Status: SHIPPED | OUTPATIENT
Start: 2023-05-08

## 2023-05-08 RX ORDER — IBUPROFEN 600 MG/1
600 TABLET ORAL EVERY 6 HOURS PRN
Qty: 30 TABLET | Refills: 0 | Status: SHIPPED | OUTPATIENT
Start: 2023-05-08

## 2023-05-08 RX ORDER — TAMSULOSIN HYDROCHLORIDE 0.4 MG/1
0.4 CAPSULE ORAL
Qty: 14 CAPSULE | Refills: 0 | Status: SHIPPED | OUTPATIENT
Start: 2023-05-08

## 2023-05-08 RX ORDER — ONDANSETRON 2 MG/ML
4 INJECTION INTRAMUSCULAR; INTRAVENOUS ONCE
Status: COMPLETED | OUTPATIENT
Start: 2023-05-08 | End: 2023-05-08

## 2023-05-08 RX ORDER — KETOROLAC TROMETHAMINE 30 MG/ML
15 INJECTION, SOLUTION INTRAMUSCULAR; INTRAVENOUS ONCE
Status: DISCONTINUED | OUTPATIENT
Start: 2023-05-08 | End: 2023-05-08 | Stop reason: HOSPADM

## 2023-05-08 RX ADMIN — IOHEXOL 100 ML: 350 INJECTION, SOLUTION INTRAVENOUS at 11:56

## 2023-05-08 RX ADMIN — ONDANSETRON 4 MG: 2 INJECTION INTRAMUSCULAR; INTRAVENOUS at 09:24

## 2023-05-08 RX ADMIN — SODIUM CHLORIDE 1000 ML: 0.9 INJECTION, SOLUTION INTRAVENOUS at 09:23

## 2023-05-08 NOTE — TELEPHONE ENCOUNTER
Pt informed per Dr Hutton   -urine protein level is high, this is nonspecific, monitor.    -Serum protein electrophoresis is abnormal - also non specific, but requires further evaluation by oncologist.   - Pt voiced understanding, confirmed oncologist is  forwarded to Dr Phil Plasencia MD, results forwarded.     Spoke to pt and advised of CRNP's recommendations:    Patient can be scheduled for new patient appointment in the next few weeks  Okay with AP  She should continue tamsulosin for medical expulsive therapy, increase water intake, and strain all urine until her office visit  Electronically signed by Sera Sharpe at 5/8/2023  3:08 PM          Pt will be called tomorrow to schedule new pt appt  She will  strainer at  of [facility] office  ED precautions reviewed

## 2023-05-08 NOTE — TELEPHONE ENCOUNTER
New Patient    What is the reason for the patient’s appointment?: pt called to schedule an appt for ER visit follow up and 3 mm right UVJ calculus with mild right hydroureteronephrosis  What office location does the patient prefer?:  [facility]   Does patient have Imaging/Lab Results: CT scan   3 mm right UVJ calculus with mild right hydroureteronephrosis    Have patient records been requested?:  If No, are the records showing in Epic:       INSURANCE:  Do we accept the patient's insurance or is the patient Self-Pay?:    Insurance Provider: [employer] [employer]   Plan Type/Number:  Member ID#:       HISTORY:   Has the patient had any previous Urologist(s)?: no   Was the patient seen in the ED?: yes     Has the patient had any outside testing done?: no    Does the patient have a personal history of cancer?: no

## 2023-05-08 NOTE — DISCHARGE INSTRUCTIONS
DISCHARGE INSTRUCTIONS:    FOLLOW UP WITH YOUR PRIMARY CARE PROVIDER OR THE [address] [place] [address]  MAKE AN APPOINTMENT TO BE SEEN  TAKE MEDICATION AS PRESCRIBED  IF RASH, SHORTNESS OF BREATH OR TROUBLE SWALLOWING, STOP TAKING THE MEDICATION AND BE SEEN  REST AND DRINK PLENTY OF FLUIDS  FOLLOW UP WITH UROLOGY  IF SYMPTOMS WORSEN OR NEW SYMPTOMS ARISE, RETURN TO THE ER TO BE SEEN

## 2023-05-08 NOTE — ED PROVIDER NOTES
History  Chief Complaint   Patient presents with   • Flank Pain     Right flank pain starting this morning  Nausea, no vomiting  Denies hematuria       42y  o female with PMH of BV, hashimoto's, iron deficiency anemia and vitamin d deficiency presents to the ER for right flank pain since earlier today  Patient denies taking any medication for symptoms  She describes her pain as aching and non-radiating  Pain is constant  Associated symptoms: nausea and vomiting  She denies fever, chills, URI symptoms, chest pain, dyspnea, diarrhea, hematuria, dysuria, frequency, urgency, weakness or paresthesias  She also reports trouble urinating  History provided by:  Patient   used: No        Prior to Admission Medications   Prescriptions Last Dose Informant Patient Reported? Taking? Cholecalciferol (Vitamin D3) 1 25 MG (54480 UT) TABS   Yes No   Sig: once a week   Synthroid 150 MCG tablet 2023  No Yes   Sig: Take 1 tablet (150 mcg total) by mouth daily   fluconazole (DIFLUCAN) 200 mg tablet   Yes No   Sig: Take by mouth if needed   liraglutide (SAXENDA) injection   No No   Sig: Inject 0 1 mL (0 6 mg total) under the skin daily Take 0 6 mg daily and increase by 0 6 mg weekly     metroNIDAZOLE (METROGEL) 0 75 % vaginal gel   No No   Sig: Insert 1 application into the vagina as needed (discharge) use once daily during flares   neomycin-polymyxin-hydrocortisone (CORTISPORIN) otic solution   No No   Sig: Administer 3 drops into the left ear every 8 (eight) hours   Patient taking differently: Administer 3 drops into the left ear if needed      Facility-Administered Medications: None       Past Medical History:   Diagnosis Date   • BV (bacterial vaginosis)    • Hashimoto's disease    • Infertility, female    • Iron deficiency anemia    • Vitamin D deficiency        Past Surgical History:   Procedure Laterality Date   •  SECTION     • WISDOM TOOTH EXTRACTION         Family History   Problem Relation Age of Onset   • Thyroid disease unspecified Mother    • Hyperlipidemia Father    • Hypertension Father    • Diabetes type II Paternal Grandfather      I have reviewed and agree with the history as documented  E-Cigarette/Vaping     E-Cigarette/Vaping Substances     Social History     Tobacco Use   • Smoking status: Never   • Smokeless tobacco: Never   Substance Use Topics   • Alcohol use: Yes     Comment: socially   • Drug use: Never       Review of Systems   Constitutional: Negative for activity change, appetite change, chills and fever  HENT: Negative for congestion, drooling, ear discharge, ear pain, facial swelling, rhinorrhea and sore throat  Eyes: Negative for redness  Respiratory: Negative for cough and shortness of breath  Cardiovascular: Negative for chest pain  Gastrointestinal: Positive for nausea and vomiting  Negative for abdominal pain and diarrhea  Genitourinary: Positive for difficulty urinating and flank pain  Negative for dysuria, frequency, hematuria and urgency  Musculoskeletal: Negative for neck stiffness  Skin: Negative for rash  Allergic/Immunologic: Negative for food allergies  Neurological: Negative for weakness and numbness  Physical Exam  Physical Exam  Vitals and nursing note reviewed  Constitutional:       General: She is not in acute distress  Appearance: She is not toxic-appearing  HENT:      Head: Normocephalic and atraumatic  Mouth/Throat:      Lips: Pink  No lesions  Mouth: Mucous membranes are moist    Eyes:      Conjunctiva/sclera: Conjunctivae normal    Neck:      Trachea: Phonation normal  No tracheal deviation  Cardiovascular:      Rate and Rhythm: Normal rate and regular rhythm  Heart sounds: Normal heart sounds, S1 normal and S2 normal  No murmur heard  No friction rub  No gallop  Pulmonary:      Effort: Pulmonary effort is normal  No respiratory distress  Breath sounds: Normal breath sounds   No decreased breath sounds, wheezing, rhonchi or rales  Chest:      Chest wall: No tenderness  Abdominal:      General: There is no distension  Tenderness: There is right CVA tenderness  There is no left CVA tenderness  Musculoskeletal:      Cervical back: Normal range of motion and neck supple  Skin:     General: Skin is warm and dry  Findings: No rash  Neurological:      Mental Status: She is alert  GCS: GCS eye subscore is 4  GCS verbal subscore is 5  GCS motor subscore is 6     Psychiatric:         Mood and Affect: Mood normal          Vital Signs  ED Triage Vitals   Temperature Pulse Respirations Blood Pressure SpO2   05/08/23 0841 05/08/23 0841 05/08/23 0841 05/08/23 0841 05/08/23 0841   97 8 °F (36 6 °C) 86 18 149/95 100 %      Temp src Heart Rate Source Patient Position - Orthostatic VS BP Location FiO2 (%)   -- 05/08/23 1041 05/08/23 0841 05/08/23 0841 --    Monitor Sitting Right arm       Pain Score       05/08/23 0841       10 - Worst Possible Pain           Vitals:    05/08/23 0841 05/08/23 1041 05/08/23 1326   BP: 149/95 102/59 126/70   Pulse: 86 75 66   Patient Position - Orthostatic VS: Sitting Lying Lying         Visual Acuity      ED Medications  Medications   ketorolac (TORADOL) injection 15 mg (0 mg Intravenous Hold 5/8/23 1201)   sodium chloride 0 9 % bolus 1,000 mL (0 mL Intravenous Stopped 5/8/23 1136)   ondansetron (ZOFRAN) injection 4 mg (4 mg Intravenous Given 5/8/23 0924)   iohexol (OMNIPAQUE) 350 MG/ML injection (SINGLE-DOSE) 100 mL (100 mL Intravenous Given 5/8/23 1156)       Diagnostic Studies  Results Reviewed     Procedure Component Value Units Date/Time    Urine Microscopic [[id number]]  (Abnormal) Collected: 05/08/23 1209    Lab Status: Final result Specimen: Urine, Clean Catch Updated: 05/08/23 1413     RBC, UA 10-20 /hpf      WBC, UA 0-1 /hpf      Epithelial Cells Occasional /hpf      Bacteria, UA Occasional /hpf      MUCUS THREADS Moderate    UA w Reflex to Microscopic w Reflex to Culture [[id number]]  (Abnormal) Collected: 05/08/23 1209    Lab Status: Final result Specimen: Urine, Clean Catch Updated: 05/08/23 1320     Color, UA Yellow     Clarity, UA Clear     Specific [place], UA 1 020     pH, UA 5 5     Leukocytes, UA Negative     Nitrite, UA Negative     Protein, UA Negative mg/dl      Glucose, UA Negative mg/dl      Ketones, UA Trace mg/dl      Urobilinogen, UA 0 2 E U /dl      Bilirubin, UA Negative     Occult Blood, UA Large    hCG, qualitative pregnancy [[id number]]  (Normal) Collected: 05/08/23 0921    Lab Status: Final result Specimen: Blood from Arm, Left Updated: 05/08/23 1110     Preg, Serum Negative    Basic metabolic panel [[id number]]  (Abnormal) Collected: 05/08/23 0921    Lab Status: Final result Specimen: Blood from Arm, Left Updated: 05/08/23 1005     Sodium 137 mmol/L      Potassium 3 4 mmol/L      Chloride 104 mmol/L      CO2 25 mmol/L      ANION GAP 8 mmol/L      BUN 9 mg/dL      Creatinine 0 93 mg/dL      Glucose 111 mg/dL      Calcium 9 0 mg/dL      eGFR 76 ml/min/1 73sq m     Narrative:      [place] guidelines for Chronic Kidney Disease (CKD):   •  Stage 1 with normal or high GFR (GFR > 90 mL/min/1 73 square meters)  •  Stage 2 Mild CKD (GFR = 60-89 mL/min/1 73 square meters)  •  Stage 3A Moderate CKD (GFR = 45-59 mL/min/1 73 square meters)  •  Stage 3B Moderate CKD (GFR = 30-44 mL/min/1 73 square meters)  •  Stage 4 Severe CKD (GFR = 15-29 mL/min/1 73 square meters)  •  Stage 5 End Stage CKD (GFR <15 mL/min/1 73 square meters)  Note: GFR calculation is accurate only with a steady state creatinine    CBC and differential [[id number]]  (Abnormal) Collected: 05/08/23 0921    Lab Status: Final result Specimen: Blood from Arm, Left Updated: 05/08/23 0939     WBC 10 40 Thousand/uL      RBC 4 70 Million/uL      Hemoglobin 12 1 g/dL      Hematocrit 38 4 %      MCV 82 fL      MCH 25 7 pg      MCHC 31 5 g/dL      RDW 14 2 %      MPV 10 1 fL Platelets 802 Thousands/uL      nRBC 0 /100 WBCs      Neutrophils Relative 80 %      Immat GRANS % 0 %      Lymphocytes Relative 14 %      Monocytes Relative 5 %      Eosinophils Relative 1 %      Basophils Relative 0 %      Neutrophils Absolute 8 30 Thousands/µL      Immature Grans Absolute 0 04 Thousand/uL      Lymphocytes Absolute 1 41 Thousands/µL      Monocytes Absolute 0 52 Thousand/µL      Eosinophils Absolute 0 09 Thousand/µL      Basophils Absolute 0 04 Thousands/µL                  CT renal stone study abdomen pelvis without contrast   Final Result by Yeny Boone MD (05/08 1234)      3 mm right UVJ calculus with mild right hydroureteronephrosis  Workstation performed: [id number]                    Procedures  Procedures         ED Course  ED Course as of 05/08/23 1555   Mon May 08, 2023   0946 WBC(!): 10 40   0946 Hemoglobin: 12 1   0946 Platelet Count: 050   1005 Potassium(!): 3 4   1005 Creatinine: 0 93   1117 PREGNANCY, SERUM: Negative                               SBIRT 22yo+    Flowsheet Row Most Recent Value   Initial Alcohol Screen: US AUDIT-C     1  How often do you have a drink containing alcohol? 0 Filed at: 05/08/2023 0922   2  How many drinks containing alcohol do you have on a typical day you are drinking? 0 Filed at: 05/08/2023 0922   3b  FEMALE Any Age, or MALE 65+: How often do you have 4 or more drinks on one occassion? 0 Filed at: 05/08/2023 6956   Audit-C Score 0 Filed at: 05/08/2023 2208   CHIKA: How many times in the past year have you    Used an illegal drug or used a prescription medication for non-medical reasons? Never Filed at: 05/08/2023 5589                    Medical Decision Making  42y  o female presents to the ER for right flank pain, nausea, vomiting and difficulty urinating since this morning  Vitals are stable  Patient is in no acute distress  On exam, lungs are clear  Heart is regular rate and rhythm  Abdomen is not distended   Right flank is tender to palpation  Will check labs and imaging  0946 WBC(!): 10 40    0946 Hemoglobin: 12 1    0946 Platelet Count: 770    1005 Potassium(!): 3 4    1005 Creatinine: 0 93    1117 PREGNANCY, SERUM: Negative    1335    Informed patient of lab and imaging findings  Patient reporting improvement in symptoms  Will discharge  Patient agreeable  The management plan was discussed in detail with the patient at bedside and all questions were answered  Prior to discharge, we provided both verbal and written instructions  We discussed with the patient the signs and symptoms for which to return to the emergency department  All questions were answered and patient was comfortable with the plan of care and discharged to home  Instructed the patient to follow up with the primary care provider and/or specialist provided and their written instructions  The patient verbalized understanding of our discussion and plan of care, and agrees to return to the Emergency Department for concerns and progression of illness  At discharge, I instructed the patient to:  -follow up with pcp  -take Motrin, Percocet, Zofran and Flomax as prescribed  -rest and drink plenty of fluids  -follow up with Urology  -return to the ER if symptoms worsened or new symptoms arose  Patient agreed to this plan and was stable at time of discharge  Hydronephrosis: acute illness or injury  Right ureteral stone: acute illness or injury  Amount and/or Complexity of Data Reviewed  Independent Historian:      Details: Patient is historian  Labs: ordered  Decision-making details documented in ED Course  Radiology: ordered  Risk  Prescription drug management            Disposition  Final diagnoses:   Right ureteral stone   Hydronephrosis     Time reflects when diagnosis was documented in both MDM as applicable and the Disposition within this note     Time User Action Codes Description Comment    5/8/2023  1:40 PM Pavan MELCHOR Add [N20 1] Right ureteral stone     5/8/2023  1:40 PM Debora MELCHOR Add [N13 30] Hydronephrosis       ED Disposition     ED Disposition   Discharge    Condition   Stable    Date/Time   Mon May 8, 2023  1:39 PM    Comment   Annie Rosenthal discharge to home/self care                 Follow-up Information     Follow up With Specialties Details Why Contact Info Additional [address] Gregoria Chester MD Family Medicine Schedule an appointment as soon as possible for a visit   [place]  [address] [phone]       [facility] Schedule an appointment as soon as possible for a visit   [place] [phone]  [address]  [facility], [address] [place] [address] [place], [place], [place], [address] [phone]          Discharge Medication List as of 5/8/2023  1:48 PM      START taking these medications    Details   ibuprofen (MOTRIN) 600 mg tablet Take 1 tablet (600 mg total) by mouth every 6 (six) hours as needed for mild pain, Starting Mon 5/8/2023, Normal      ondansetron (ZOFRAN-ODT) 4 mg disintegrating tablet Take 1 tablet (4 mg total) by mouth every 8 (eight) hours as needed for nausea or vomiting, Starting Mon 5/8/2023, Normal      oxyCODONE-acetaminophen (PERCOCET) 5-325 mg per tablet Take 1 tablet by mouth every 4 (four) hours as needed for moderate pain Max Daily Amount: 6 tablets, Starting Mon 5/8/2023, Normal      tamsulosin (FLOMAX) 0 4 mg Take 1 capsule (0 4 mg total) by mouth daily with dinner, Starting Mon 5/8/2023, Normal         CONTINUE these medications which have NOT CHANGED    Details   Synthroid 150 MCG tablet Take 1 tablet (150 mcg total) by mouth daily, Starting Fri 2/17/2023, Fill Later      Cholecalciferol (Vitamin D3) 1 25 MG (38410 UT) TABS once a week, Historical Med      fluconazole (DIFLUCAN) 200 mg tablet Take by mouth if needed, Starting Wed 2/16/2022, Historical Med      liraglutide (SAXENDA) injection Inject 0 1 mL (0 6 mg total) under the skin daily Take 0 6 mg daily and increase by 0 6 mg weekly  , Starting Fri 2/17/2023, Normal      metroNIDAZOLE (METROGEL) 0 75 % vaginal gel Insert 1 application into the vagina as needed (discharge) use once daily during flares, Starting Wed 4/6/2022, Normal      neomycin-polymyxin-hydrocortisone (CORTISPORIN) otic solution Administer 3 drops into the left ear every 8 (eight) hours, Starting Thu 4/7/2022, Normal             No discharge procedures on file      PDMP Review     None          ED Provider  Electronically Signed by           Mark Coronel PA-C  05/08/23 0740

## 2023-05-08 NOTE — TELEPHONE ENCOUNTER
Patient can be scheduled for new patient appointment in the next few weeks  Okay with AP  She should continue tamsulosin for medical expulsive therapy, increase water intake, and strain all urine until her office visit

## 2023-05-09 NOTE — TELEPHONE ENCOUNTER
LMOM to offer new pt appt with Dr Jake Garcia on 5/11  Asked pt to call office to let us know if she can take this appt if it is still available

## 2023-05-10 ENCOUNTER — HOSPITAL ENCOUNTER (OUTPATIENT)
Dept: INFUSION CENTER | Facility: CLINIC | Age: 43
Discharge: HOME | End: 2023-05-10

## 2023-05-10 VITALS
DIASTOLIC BLOOD PRESSURE: 74 MMHG | HEART RATE: 73 BPM | SYSTOLIC BLOOD PRESSURE: 109 MMHG | RESPIRATION RATE: 18 BRPM | TEMPERATURE: 97.2 F

## 2023-05-10 DIAGNOSIS — D50.9 IRON DEFICIENCY ANEMIA, UNSPECIFIED IRON DEFICIENCY ANEMIA TYPE: Primary | ICD-10-CM

## 2023-05-10 RX ORDER — SODIUM CHLORIDE 9 MG/ML
20 INJECTION, SOLUTION INTRAVENOUS ONCE
Status: CANCELLED | OUTPATIENT
Start: 2023-05-17

## 2023-05-10 RX ORDER — SODIUM CHLORIDE 9 MG/ML
20 INJECTION, SOLUTION INTRAVENOUS ONCE
Status: COMPLETED | OUTPATIENT
Start: 2023-05-10 | End: 2023-05-10

## 2023-05-10 RX ADMIN — IRON SUCROSE 300 MG: 20 INJECTION, SOLUTION INTRAVENOUS at 14:12

## 2023-05-10 RX ADMIN — SODIUM CHLORIDE 20 ML/HR: 0.9 INJECTION, SOLUTION INTRAVENOUS at 14:12

## 2023-05-11 ENCOUNTER — OFFICE VISIT (OUTPATIENT)
Dept: UROLOGY | Facility: MEDICAL CENTER | Age: 43
End: 2023-05-11

## 2023-05-11 VITALS
HEART RATE: 85 BPM | DIASTOLIC BLOOD PRESSURE: 78 MMHG | OXYGEN SATURATION: 100 % | HEIGHT: 64 IN | WEIGHT: 217 LBS | BODY MASS INDEX: 37.05 KG/M2 | SYSTOLIC BLOOD PRESSURE: 124 MMHG

## 2023-05-11 DIAGNOSIS — N20.1 RIGHT URETERAL STONE: Primary | ICD-10-CM

## 2023-05-11 LAB
SL AMB  POCT GLUCOSE, UA: NORMAL
SL AMB LEUKOCYTE ESTERASE,UA: NORMAL
SL AMB POCT BILIRUBIN,UA: NORMAL
SL AMB POCT BLOOD,UA: NORMAL
SL AMB POCT CLARITY,UA: CLEAR
SL AMB POCT COLOR,UA: YELLOW
SL AMB POCT KETONES,UA: NORMAL
SL AMB POCT NITRITE,UA: NORMAL
SL AMB POCT PH,UA: 5.5
SL AMB POCT SPECIFIC GRAVITY,UA: 1.02
SL AMB POCT URINE PROTEIN: NORMAL
SL AMB POCT UROBILINOGEN: 0.2

## 2023-05-11 NOTE — PROGRESS NOTES
"Assessment/Plan:  #1  Right distal 3 mm ureteral calculus-currently asymptomatic  Patient has elected to pursue expulsive therapy and will maintain vigorous oral fluid intake as well as alpha blockade  Straining of urine is encouraged  If the patient does not have any severe episodes of colic repeat CT stone study will be performed in 6 weeks  If the stone is still in position then consideration towards extraction or fragmentation will take place but expectation is that this will pass spontaneously  Eventual metabolic stone work-up in this 28-year-old woman  No problem-specific Assessment & Plan notes found for this encounter  Diagnoses and all orders for this visit:    Right ureteral stone  -     POCT urine dip auto non-scope  -     CT renal stone study abdomen pelvis wo contrast; Future  -     Basic metabolic panel; Future          Subjective:      Patient ID: Jennifer Meehan is a 43 y o  female  HPI  28-year-old female developed right flank pain with radiation to the right lower quadrant of the abdomen  The patient underwent CT scanning on 5/8/2023 which revealed right hydronephrosis secondary to a 3 mm distal right ureteral calculus  Her pain has since dissipated and she is currently comfortable  She presents for treatment plan  The following portions of the patient's history were reviewed and updated as appropriate: allergies, current medications, past family history, past medical history, past social history, past surgical history and problem list     Review of Systems   Gastrointestinal: Positive for abdominal distention and abdominal pain  Genitourinary: Positive for flank pain and urgency  All other systems reviewed and are negative  Objective:      /78 (BP Location: Left arm, Patient Position: Sitting, Cuff Size: Adult)   Pulse 85   Ht 5' 4\" (1 626 m)   Wt 98 4 kg (217 lb)   LMP 05/07/2023   SpO2 100%   BMI 37 25 kg/m²          Physical Exam  Vitals reviewed   " Constitutional:       General: She is not in acute distress  Appearance: Normal appearance  She is not ill-appearing, toxic-appearing or diaphoretic  HENT:      Head: Normocephalic and atraumatic  Nose: Nose normal       Mouth/Throat:      Mouth: Mucous membranes are moist    Eyes:      Extraocular Movements: Extraocular movements intact  Pulmonary:      Effort: Pulmonary effort is normal  No respiratory distress  Abdominal:      Palpations: Abdomen is soft  Musculoskeletal:      Cervical back: Neck supple  Skin:     General: Skin is dry  Neurological:      Mental Status: She is alert and oriented to person, place, and time  Psychiatric:         Mood and Affect: Mood normal          Behavior: Behavior normal          Thought Content:  Thought content normal          Judgment: Judgment normal

## 2023-05-17 ENCOUNTER — HOSPITAL ENCOUNTER (OUTPATIENT)
Dept: INFUSION CENTER | Facility: CLINIC | Age: 43
Discharge: HOME | End: 2023-05-17

## 2023-05-17 VITALS
RESPIRATION RATE: 18 BRPM | DIASTOLIC BLOOD PRESSURE: 78 MMHG | SYSTOLIC BLOOD PRESSURE: 117 MMHG | TEMPERATURE: 97.8 F | HEART RATE: 80 BPM

## 2023-05-17 DIAGNOSIS — D50.9 IRON DEFICIENCY ANEMIA, UNSPECIFIED IRON DEFICIENCY ANEMIA TYPE: Primary | ICD-10-CM

## 2023-05-17 RX ORDER — SODIUM CHLORIDE 9 MG/ML
20 INJECTION, SOLUTION INTRAVENOUS ONCE
Status: COMPLETED | OUTPATIENT
Start: 2023-05-17 | End: 2023-05-17

## 2023-05-17 RX ORDER — SODIUM CHLORIDE 9 MG/ML
20 INJECTION, SOLUTION INTRAVENOUS ONCE
Status: CANCELLED | OUTPATIENT
Start: 2023-05-24

## 2023-05-17 RX ADMIN — IRON SUCROSE 300 MG: 20 INJECTION, SOLUTION INTRAVENOUS at 14:03

## 2023-05-17 RX ADMIN — SODIUM CHLORIDE 20 ML/HR: 0.9 INJECTION, SOLUTION INTRAVENOUS at 14:03

## 2023-05-24 ENCOUNTER — HOSPITAL ENCOUNTER (OUTPATIENT)
Dept: INFUSION CENTER | Facility: CLINIC | Age: 43
Discharge: HOME | End: 2023-05-24

## 2023-05-24 VITALS
TEMPERATURE: 97.3 F | DIASTOLIC BLOOD PRESSURE: 72 MMHG | HEART RATE: 80 BPM | SYSTOLIC BLOOD PRESSURE: 106 MMHG | RESPIRATION RATE: 18 BRPM

## 2023-05-24 DIAGNOSIS — D50.9 IRON DEFICIENCY ANEMIA, UNSPECIFIED IRON DEFICIENCY ANEMIA TYPE: Primary | ICD-10-CM

## 2023-05-24 RX ORDER — SODIUM CHLORIDE 9 MG/ML
20 INJECTION, SOLUTION INTRAVENOUS ONCE
Status: CANCELLED | OUTPATIENT
Start: 2023-05-24

## 2023-05-24 RX ORDER — SODIUM CHLORIDE 9 MG/ML
20 INJECTION, SOLUTION INTRAVENOUS ONCE
Status: COMPLETED | OUTPATIENT
Start: 2023-05-24 | End: 2023-05-24

## 2023-05-24 RX ADMIN — IRON SUCROSE 300 MG: 20 INJECTION, SOLUTION INTRAVENOUS at 13:10

## 2023-05-24 RX ADMIN — SODIUM CHLORIDE 20 ML/HR: 0.9 INJECTION, SOLUTION INTRAVENOUS at 13:09

## 2023-06-28 ENCOUNTER — HOSPITAL ENCOUNTER (OUTPATIENT)
Dept: CT IMAGING | Facility: HOSPITAL | Age: 43
Discharge: HOME | End: 2023-06-28
Attending: UROLOGY

## 2023-06-28 DIAGNOSIS — N20.1 RIGHT URETERAL STONE: ICD-10-CM

## 2023-06-28 RX ORDER — THYROID,PORK 90 MG
TABLET ORAL
COMMUNITY
Start: 2023-06-12 | End: 2023-08-18 | Stop reason: ALTCHOICE

## 2023-07-05 ENCOUNTER — HOSPITAL ENCOUNTER (OUTPATIENT)
Dept: CT IMAGING | Facility: HOSPITAL | Age: 43
Discharge: HOME | End: 2023-07-05
Attending: UROLOGY
Payer: COMMERCIAL

## 2023-07-05 PROCEDURE — 74176 CT ABD & PELVIS W/O CONTRAST: CPT

## 2023-07-05 PROCEDURE — G1004 CDSM NDSC: HCPCS

## 2023-07-12 ENCOUNTER — OFFICE VISIT (OUTPATIENT)
Dept: UROLOGY | Facility: MEDICAL CENTER | Age: 43
End: 2023-07-12
Payer: COMMERCIAL

## 2023-07-12 VITALS
HEIGHT: 64 IN | SYSTOLIC BLOOD PRESSURE: 118 MMHG | OXYGEN SATURATION: 100 % | DIASTOLIC BLOOD PRESSURE: 80 MMHG | HEART RATE: 70 BPM | WEIGHT: 213 LBS | BODY MASS INDEX: 36.37 KG/M2

## 2023-07-12 DIAGNOSIS — N20.1 RIGHT URETERAL STONE: Primary | ICD-10-CM

## 2023-07-12 PROCEDURE — 99213 OFFICE O/P EST LOW 20 MIN: CPT | Performed by: UROLOGY

## 2023-07-12 NOTE — PROGRESS NOTES
Assessment/Plan:  #1. Right ureteral calculus-past on most recent CT. No residual urinary tract stones noted. Patient advised to maintain vigorous oral hydration low-sodium diet and utilize lemon water. Referral to nephrology for metabolic stone evaluation and treatment as patient is young. No problem-specific Assessment & Plan notes found for this encounter. Diagnoses and all orders for this visit:    Right ureteral stone  Comments:  passed  Orders:  -     Ambulatory Referral to Nephrology; Future          Subjective:      Patient ID: Kerry Serrato is a 43 y.o. female. HPI  15-year-old female with a right ureteral calculus, 3 mm in size on previous CT presents with current CT revealing no evidence of urinary stones bilaterally. There is no hydronephrosis. She is urologically stable. The patient presents for review of CT and for referral to nephrology for metabolic stone work-up  The following portions of the patient's history were reviewed and updated as appropriate: allergies, current medications, past family history, past medical history, past social history, past surgical history and problem list.    Review of Systems   All other systems reviewed and are negative. Objective:      /80 (BP Location: Left arm, Patient Position: Sitting, Cuff Size: Large)   Pulse 70   Ht 5' 4" (1.626 m)   Wt 96.6 kg (213 lb)   SpO2 100%   BMI 36.56 kg/m²          Physical Exam  Vitals reviewed. Constitutional:       General: She is not in acute distress. Appearance: She is not ill-appearing, toxic-appearing or diaphoretic. HENT:      Head: Normocephalic. Nose: Nose normal.      Mouth/Throat:      Mouth: Mucous membranes are moist.   Eyes:      Extraocular Movements: Extraocular movements intact. Pulmonary:      Effort: Pulmonary effort is normal. No respiratory distress. Abdominal:      General: There is no distension. Palpations: Abdomen is soft. Tenderness:  There is no abdominal tenderness. There is no guarding. Musculoskeletal:         General: Normal range of motion. Skin:     General: Skin is dry. Neurological:      Mental Status: She is alert and oriented to person, place, and time. Psychiatric:         Mood and Affect: Mood normal.         Behavior: Behavior normal.         Thought Content:  Thought content normal.         Judgment: Judgment normal.

## 2023-07-12 NOTE — LETTER
July 12, 2023     Tamica Lopez, [address] [place] [address]  [address] [address] [place] [id number]    Patient: Geen Cantu   YOB: 1980   Date of Visit: 7/12/2023       Dear Dr. Kamila Rubio:    Thank you for referring Gene Cantu to me for evaluation. Below are my notes for this consultation. If you have questions, please do not hesitate to call me. I look forward to following your patient along with you. Sincerely,        Isra Porras MD        CC: No Recipients    Isra Porras MD  7/12/2023  3:31 PM  Sign when Signing Visit  Assessment/Plan:  #1. Right ureteral calculus-past on most recent CT. No residual urinary tract stones noted. Patient advised to maintain vigorous oral hydration low-sodium diet and utilize lemon water. Referral to nephrology for metabolic stone evaluation and treatment as patient is young. No problem-specific Assessment & Plan notes found for this encounter. Diagnoses and all orders for this visit:    Right ureteral stone  Comments:  passed  Orders:  -     Ambulatory Referral to Nephrology; Future         Subjective:     Patient ID: Gene Cantu is a 43 y.o. female. HPI  51-year-old female with a right ureteral calculus, 3 mm in size on previous CT presents with current CT revealing no evidence of urinary stones bilaterally. There is no hydronephrosis. She is urologically stable. The patient presents for review of CT and for referral to nephrology for metabolic stone work-up  The following portions of the patient's history were reviewed and updated as appropriate: allergies, current medications, past family history, past medical history, past social history, past surgical history and problem list.    Review of Systems   All other systems reviewed and are negative.        Objective:      /80 (BP Location: Left arm, Patient Position: Sitting, Cuff Size: Large)   Pulse 70   Ht 5' 4" (1.626 m)   Wt 96.6 kg (213 lb)   SpO2 100%   BMI 36.56 kg/m²         Physical Exam  Vitals reviewed. Constitutional:       General: She is not in acute distress. Appearance: She is not ill-appearing, toxic-appearing or diaphoretic. HENT:      Head: Normocephalic. Nose: Nose normal.      Mouth/Throat:      Mouth: Mucous membranes are moist.   Eyes:      Extraocular Movements: Extraocular movements intact. Pulmonary:      Effort: Pulmonary effort is normal. No respiratory distress. Abdominal:      General: There is no distension. Palpations: Abdomen is soft. Tenderness: There is no abdominal tenderness. There is no guarding. Musculoskeletal:         General: Normal range of motion. Skin:     General: Skin is dry. Neurological:      Mental Status: She is alert and oriented to person, place, and time. Psychiatric:         Mood and Affect: Mood normal.         Behavior: Behavior normal.         Thought Content:  Thought content normal.         Judgment: Judgment normal.

## 2023-07-17 ENCOUNTER — TELEPHONE (OUTPATIENT)
Dept: NEPHROLOGY | Facility: CLINIC | Age: 43
End: 2023-07-17

## 2023-07-18 ENCOUNTER — TELEPHONE (OUTPATIENT)
Dept: NEPHROLOGY | Facility: CLINIC | Age: 43
End: 2023-07-18

## 2023-07-18 NOTE — TELEPHONE ENCOUNTER
New Patient Intake Form   Patient Details   Gene Cantu     1980     [id number]     Insurance Information   Name of Ricardo   Does the patient need an insurance referral? no   If patient has [employer], please ask if they will be using their [employer]ney Barbra. Appointment Information   Who is calling to schedule? If not patient, what is callers name? pt   Referring Provider Isra Porras MD   Reason for Appt (Diagnosis) N20.1 (ICD-10-CM) - Right ureteral stone   Does Patient have labs/urine done at [facility]? If not, where do they go? List the date of last lab / urine  *Please try to get labs 2 years back if not at [facility]   Has patient been hospitalized recently? If yes, list name and location of hospital they were In ER on 5/08/23   Has patient been seen by a Nephrologist before? If yes, list name, location and phone number NO   Has the patient had renal imaging done? If so, list the most recent date and type of imagineg YES, CT on 5/08/23 & 7/05/23   Does patient have a history of Kidney Stones? yes   Appointment Details   Is there a referral on file?  yes   Appointment Date 10/23/23   Location [place]   Miscellaneous  Added to waitlist

## 2023-07-26 ENCOUNTER — TELEPHONE (OUTPATIENT)
Dept: HEMATOLOGY ONCOLOGY | Facility: CLINIC | Age: 43
End: 2023-07-26

## 2023-07-27 LAB
BASOPHILS # BLD AUTO: 0 X10E3/UL (ref 0–0.2)
BASOPHILS NFR BLD AUTO: 0 %
EOSINOPHIL # BLD AUTO: 0.3 X10E3/UL (ref 0–0.4)
EOSINOPHIL NFR BLD AUTO: 3 %
ERYTHROCYTE [DISTWIDTH] IN BLOOD BY AUTOMATED COUNT: 15.1 % (ref 11.7–15.4)
FERRITIN SERPL-MCNC: 229 NG/ML (ref 15–150)
FOLATE SERPL-MCNC: 11.5 NG/ML
HCT VFR BLD AUTO: 41 % (ref 34–46.6)
HGB BLD-MCNC: 13.4 G/DL (ref 11.1–15.9)
IMM GRANULOCYTES # BLD: 0 X10E3/UL (ref 0–0.1)
IMM GRANULOCYTES NFR BLD: 0 %
IRON SATN MFR SERPL: 22 % (ref 15–55)
IRON SERPL-MCNC: 62 UG/DL (ref 27–159)
LYMPHOCYTES # BLD AUTO: 3.5 X10E3/UL (ref 0.7–3.1)
LYMPHOCYTES NFR BLD AUTO: 34 %
MCH RBC QN AUTO: 27.8 PG (ref 26.6–33)
MCHC RBC AUTO-ENTMCNC: 32.7 G/DL (ref 31.5–35.7)
MCV RBC AUTO: 85 FL (ref 79–97)
MONOCYTES # BLD AUTO: 0.6 X10E3/UL (ref 0.1–0.9)
MONOCYTES NFR BLD AUTO: 6 %
NEUTROPHILS # BLD AUTO: 5.9 X10E3/UL (ref 1.4–7)
NEUTROPHILS NFR BLD AUTO: 57 %
PLATELET # BLD AUTO: 273 X10E3/UL (ref 150–450)
RBC # BLD AUTO: 4.82 X10E6/UL (ref 3.77–5.28)
T4 FREE SERPL-MCNC: 0.96 NG/DL (ref 0.82–1.77)
TIBC SERPL-MCNC: 284 UG/DL (ref 250–450)
TSH SERPL DL<=0.005 MIU/L-ACNC: 0.38 UIU/ML (ref 0.45–4.5)
UIBC SERPL-MCNC: 222 UG/DL (ref 131–425)
VIT B12 SERPL-MCNC: 1940 PG/ML (ref 232–1245)
WBC # BLD AUTO: 10.2 X10E3/UL (ref 3.4–10.8)

## 2023-08-02 ENCOUNTER — OFFICE VISIT (OUTPATIENT)
Dept: HEMATOLOGY ONCOLOGY | Facility: CLINIC | Age: 43
End: 2023-08-02
Payer: COMMERCIAL

## 2023-08-02 VITALS
BODY MASS INDEX: 35.85 KG/M2 | SYSTOLIC BLOOD PRESSURE: 90 MMHG | OXYGEN SATURATION: 99 % | DIASTOLIC BLOOD PRESSURE: 70 MMHG | HEIGHT: 64 IN | WEIGHT: 210 LBS | HEART RATE: 78 BPM | TEMPERATURE: 97.3 F

## 2023-08-02 DIAGNOSIS — E63.9 NUTRITIONAL DEFICIENCY: ICD-10-CM

## 2023-08-02 DIAGNOSIS — D50.9 IRON DEFICIENCY ANEMIA, UNSPECIFIED IRON DEFICIENCY ANEMIA TYPE: Primary | ICD-10-CM

## 2023-08-02 PROCEDURE — 99214 OFFICE O/P EST MOD 30 MIN: CPT

## 2023-08-02 RX ORDER — IRON,CARBONYL/ASCORBIC ACID 65MG-125MG
65-125 TABLET, DELAYED RELEASE (ENTERIC COATED) ORAL DAILY
Qty: 30 TABLET | Refills: 0 | Status: SHIPPED | OUTPATIENT
Start: 2023-08-02

## 2023-08-02 NOTE — PROGRESS NOTES
HEMATOLOGY / [address] [place] [address] FOLLOW UP NOTE    Primary Care Provider: Shaneka Briggs MD  Referring Provider:    MRN: [id number]  : 1980    Reason for Encounter: Iron deficiency anemia       Interval History: Patient presents for follow-up for iron deficiency anemia secondary to heavy menstrual cycles. Karina Verdin is status post IV iron infusions Venofer 300 mg weekly 4 doses. Patient reports she has a little bit more energy  and is able to go to the gym. Patient reports she has changed her diet and follows a more healthy diet with green smoothies, meat and vegetables. Patient is still experiencing heavy menstrual cycles, lasts 7 days, 4 days being heavier. Labs: Hgb 13.4, MCV 85, Ferritin 229, serum iron 62, % saturation 22, vitamin B12 1940, folate 11.5. REVIEW OF SYSTEMS:  Please note that a 14-point review of systems was performed to include Constitutional, HEENT, Respiratory, CVS, GI, , Musculoskeletal, Integumentary, Neurologic, Rheumatologic, Endocrinologic, Psychiatric, Lymphatic, and Hematologic/Oncologic systems were reviewed and are negative unless otherwise stated in HPI. Positive and negative findings pertinent to this evaluation are incorporated into the history of present illness. ECOG PS: 0     PROBLEM LIST:  Patient Active Problem List   Diagnosis   • Alopecia areata   • Anemia   • Hashimoto's thyroiditis   • Vitamin D deficiency   • Encounter for screening mammogram for breast cancer   • Abnormal mammogram   • BV (bacterial vaginosis)   • Seborrheic eczema   • Anxiety   • Hypothyroidism due to Hashimoto's thyroiditis   • Less than 8 weeks gestation of pregnancy   • Thyroid nodule   • Screening for HIV (human immunodeficiency virus)   • Infertility, female   • Encounter for annual physical exam   • Other fatigue   • Obesity (BMI 30-39. 9)   • Iron deficiency anemia       Assessment / Plan:  Discussed patient's labs in detail. Her counts and symptoms have improved.   Since she is still having heavy menstrual cycles, advised her to start oral iron supplements. She was having GI symptoms with oral iron supplements however, advised her to try Vitron C, which is better tolerated. Patient stated she will try it. Advised to cut back her Vitamin B12 to once a day instead of twice a day. She could also benefit from folic acid supplements as it is on the lower side of normal.  We will repeat labs in 3 months (CBC, Iron Panel, Vitamin B12 and folate) and I'll call her with the results. If count remain stable, she can follow up with her PCP at that time. Patient agreeable with plan. Patient aware to contact us for additional questions/concerns. I spent 30 minutes on chart review, face to face counseling time, coordination of care and documentation. Past Medical History:   has a past medical history of BV (bacterial vaginosis), Hashimoto's disease, Infertility, female, Iron deficiency anemia, and Vitamin D deficiency. PAST SURGICAL HISTORY:   has a past surgical history that includes [place] tooth extraction and  section. CURRENT MEDICATIONS  Current Outpatient Medications   Medication Sig Dispense Refill   • [place] Thyroid 90 MG tablet      • Cholecalciferol (Vitamin D3) 1.25 MG (01329 UT) TABS once a week     • fluconazole (DIFLUCAN) 200 mg tablet Take by mouth if needed     • Iron-Vitamin C (Vitron-C)  MG TABS Take  mg by mouth in the morning 30 tablet 0   • liraglutide (SAXENDA) injection Inject 0.1 mL (0.6 mg total) under the skin daily Take 0.6 mg daily and increase by 0.6 mg weekly.  6 mL 0   • metroNIDAZOLE (METROGEL) 0.75 % vaginal gel Insert 1 application into the vagina as needed (discharge) use once daily during flares (Patient not taking: Reported on 2023) 70 g 2   • neomycin-polymyxin-hydrocortisone (CORTISPORIN) otic solution Administer 3 drops into the left ear every 8 (eight) hours (Patient not taking: Reported on 2023) 10 mL 0   • Synthroid 150 MCG tablet Take 1 tablet (150 mcg total) by mouth daily (Patient not taking: Reported on 8/2/2023) 31 tablet 2     No current facility-administered medications for this visit. [unfilled]    SOCIAL HISTORY:   reports that she has never smoked. She has never used smokeless tobacco. She reports current alcohol use. She reports that she does not use drugs. FAMILY HISTORY:  family history includes Diabetes type II in her paternal grandfather; Hyperlipidemia in her father; Hypertension in her father; Thyroid disease unspecified in her mother. ALLERGIES:  is allergic to almond (diagnostic) - food allergy. Physical Exam:  Vital Signs:   Visit Vitals  BP 90/70 (BP Location: Left arm, Patient Position: Sitting, Cuff Size: Large)   Pulse 78   Temp (!) 97.3 °F (36.3 °C) (Temporal)   Ht 5' 4" (1.626 m)   Wt 95.3 kg (210 lb)   SpO2 99%   BMI 36.05 kg/m²   OB Status Having periods   Smoking Status Never   BSA 2 m²     Body mass index is 36.05 kg/m². Body surface area is 2 meters squared. GEN: Alert, awake oriented x3, in no acute distress  HEENT- No pallor, icterus, cyanosis, no oral mucosal lesions,   LAD - no palpable cervical, clavicle, axillary, inguinal LAD  Heart- normal S1 S2, regular rate and rhythm, No murmur, rubs.    Lungs- clear breathing sound bilateral.   Abdomen- soft, Non tender, bowel sounds present  Extremities- No cyanosis, clubbing, edema  Neuro- No focal neurological deficit    Labs:  Lab Results   Component Value Date    WBC 10.2 07/26/2023    HGB 13.4 07/26/2023    HCT 41.0 07/26/2023    MCV 85 07/26/2023     07/26/2023     Lab Results   Component Value Date    SODIUM 137 05/08/2023    K 3.4 (L) 05/08/2023     05/08/2023    CO2 25 05/08/2023    AGAP 8 05/08/2023    BUN 9 05/08/2023    CREATININE 0.93 05/08/2023    GLUC 111 05/08/2023    CALCIUM 9.0 05/08/2023    EGFR 76 05/08/2023

## 2023-08-18 ENCOUNTER — OFFICE VISIT (OUTPATIENT)
Dept: ENDOCRINOLOGY | Facility: CLINIC | Age: 43
End: 2023-08-18
Payer: COMMERCIAL

## 2023-08-18 VITALS
WEIGHT: 214.2 LBS | HEIGHT: 64 IN | HEART RATE: 62 BPM | SYSTOLIC BLOOD PRESSURE: 100 MMHG | BODY MASS INDEX: 36.57 KG/M2 | DIASTOLIC BLOOD PRESSURE: 70 MMHG

## 2023-08-18 DIAGNOSIS — D50.9 IRON DEFICIENCY ANEMIA, UNSPECIFIED IRON DEFICIENCY ANEMIA TYPE: ICD-10-CM

## 2023-08-18 DIAGNOSIS — E55.9 VITAMIN D DEFICIENCY: ICD-10-CM

## 2023-08-18 DIAGNOSIS — E04.1 THYROID NODULE: ICD-10-CM

## 2023-08-18 DIAGNOSIS — E06.3 HYPOTHYROIDISM DUE TO HASHIMOTO'S THYROIDITIS: ICD-10-CM

## 2023-08-18 DIAGNOSIS — E06.3 HASHIMOTO'S THYROIDITIS: Primary | ICD-10-CM

## 2023-08-18 DIAGNOSIS — E03.8 HYPOTHYROIDISM DUE TO HASHIMOTO'S THYROIDITIS: ICD-10-CM

## 2023-08-18 PROCEDURE — 99214 OFFICE O/P EST MOD 30 MIN: CPT

## 2023-08-18 NOTE — PROGRESS NOTES
Established Patient Progress Note    CC: Follow up for hypothyroidism due to Hashimoto's thyroiditis    Impression & Plan:    Problem List Items Addressed This Visit        Endocrine    Hashimoto's thyroiditis - Primary    Relevant Orders    TSH, 3rd generation Lab Collect    T4, free Lab Collect    TSH, 3rd generation    T4, free    Hypothyroidism due to Hashimoto's thyroiditis     She started taking [place] thyroid again by mistake with last set of labs which showed overreplacement of thyroid hormone. She started taking Synthroid again 1 week ago after realizing her mistake. - repeat lab work in 6 weeks. Orders placed         Relevant Orders    TSH, 3rd generation Lab Collect    T4, free Lab Collect    TSH, 3rd generation    T4, free    Thyroid nodule     She has had imaging for this in the past 2013/2014 in [place] which did not require biopsy. She has the results at home. She will send them to us. Clinically she is asymptomatic. Other    Vitamin D deficiency     In April her level was 31. We discussed she may benefit from having a vitamin D level around 40. Since she is taking 2000 IU/day, she can increase this to 4000 every other day. Repeat vitamin D level ordered for 3 months. Relevant Orders    Vitamin D 25 hydroxy Lab Collect    Iron deficiency anemia     Continue with management through hematology            Orders Placed This Encounter   Procedures   • TSH, 3rd generation Lab Collect     This is a patient instruction: This test is non-fasting. Please drink two glasses of water morning of bloodwork. Standing Status:   Future     Standing Expiration Date:   8/18/2024   • T4, free Lab Collect     Standing Status:   Future     Standing Expiration Date:   8/18/2024   • Vitamin D 25 hydroxy Lab Collect     Standing Status:   Future     Standing Expiration Date:   8/18/2024   • TSH, 3rd generation     This is a patient instruction: This test is non-fasting.  Please drink two glasses of water morning of bloodwork. Standing Status:   Future     Standing Expiration Date:   2024   • T4, free     Standing Status:   Future     Standing Expiration Date:   2024       History of Present Illness:   Chico Ross is a 43 y.o. female with a history of hypothyroidism due to Hashimoto's thyroiditis, vitamin D deficiency, iron deficiency. She is currently prescribed 150 mcg of Synthroid however by mistake, she had been taking her [place] Thyroid at the time of her most recent labs which showed over replacement of thyroid hormone. When she realized her mistake, she resumed taking 150 mcg of Synthroid 1 week ago. She also has a history of thyroid nodule. She had a thyroid ultrasound completed in  while she was living in [place]. This did not need a biopsy. She denies any family history of thyroid cancer or exposure to radiation. She denies any compressive or obstructive symptoms. She states she has a copy of the ultrasound results at home and will send us a copy. She has no complaints today except for hair loss. Overall she feels well since starting iron infusions. Patient Active Problem List   Diagnosis   • Alopecia areata   • Anemia   • Hashimoto's thyroiditis   • Vitamin D deficiency   • Encounter for screening mammogram for breast cancer   • Abnormal mammogram   • BV (bacterial vaginosis)   • Seborrheic eczema   • Anxiety   • Hypothyroidism due to Hashimoto's thyroiditis   • Less than 8 weeks gestation of pregnancy   • Thyroid nodule   • Screening for HIV (human immunodeficiency virus)   • Infertility, female   • Encounter for annual physical exam   • Other fatigue   • Obesity (BMI 30-39. 9)   • Iron deficiency anemia      Past Medical History:   Diagnosis Date   • BV (bacterial vaginosis)    • Hashimoto's disease    • Infertility, female    • Iron deficiency anemia    • Vitamin D deficiency       Past Surgical History:   Procedure Laterality Date   •  SECTION • WISDOM TOOTH EXTRACTION        Family History   Problem Relation Age of Onset   • Thyroid disease unspecified Mother    • Hyperlipidemia Father    • Hypertension Father    • Diabetes type II Paternal Grandfather      Social History     Tobacco Use   • Smoking status: Never     Passive exposure: Never   • Smokeless tobacco: Never   Substance Use Topics   • Alcohol use: Not Currently     Comment: socially     Allergies   Allergen Reactions   • [place] (Diagnostic) - Food Allergy Itching and Other (See Comments)         Current Outpatient Medications:   •  Cholecalciferol (Vitamin D3) 1.25 MG (84163 UT) TABS, once a week, Disp: , Rfl:   •  fluconazole (DIFLUCAN) 200 mg tablet, Take by mouth if needed, Disp: , Rfl:   •  Iron-Vitamin C (Vitron-C)  MG TABS, Take  mg by mouth in the morning, Disp: 30 tablet, Rfl: 0  •  Synthroid 150 MCG tablet, Take 1 tablet (150 mcg total) by mouth daily, Disp: 31 tablet, Rfl: 2  •  liraglutide (SAXENDA) injection, Inject 0.1 mL (0.6 mg total) under the skin daily Take 0.6 mg daily and increase by 0.6 mg weekly. (Patient not taking: Reported on 8/18/2023), Disp: 6 mL, Rfl: 0  •  metroNIDAZOLE (METROGEL) 0.75 % vaginal gel, Insert 1 application into the vagina as needed (discharge) use once daily during flares (Patient not taking: Reported on 7/12/2023), Disp: 70 g, Rfl: 2  •  neomycin-polymyxin-hydrocortisone (CORTISPORIN) otic solution, Administer 3 drops into the left ear every 8 (eight) hours (Patient not taking: Reported on 7/12/2023), Disp: 10 mL, Rfl: 0    Review of Systems   Constitutional: Negative for chills and fever. HENT: Negative for ear pain and sore throat. Eyes: Negative for pain and visual disturbance. Respiratory: Negative for cough and shortness of breath. Cardiovascular: Negative for chest pain and palpitations. Gastrointestinal: Negative for abdominal pain and vomiting. Genitourinary: Negative for dysuria and hematuria.    Musculoskeletal: Negative for arthralgias and back pain. Skin: Negative for color change and rash. Neurological: Negative for seizures and syncope. All other systems reviewed and are negative. Physical Exam:  Body mass index is 36.77 kg/m². /70   Pulse 62   Ht 5' 4" (1.626 m)   Wt 97.2 kg (214 lb 3.2 oz)   BMI 36.77 kg/m²    Wt Readings from Last 3 Encounters:   08/18/23 97.2 kg (214 lb 3.2 oz)   08/02/23 95.3 kg (210 lb)   07/12/23 96.6 kg (213 lb)       Physical Exam  Vitals reviewed. Constitutional:       Appearance: Normal appearance. HENT:      Head: Normocephalic and atraumatic. Cardiovascular:      Rate and Rhythm: Normal rate. Heart sounds: Normal heart sounds. Pulmonary:      Effort: Pulmonary effort is normal.      Breath sounds: Normal breath sounds. Musculoskeletal:      Cervical back: Neck supple. No tenderness. Lymphadenopathy:      Cervical: No cervical adenopathy. Neurological:      Mental Status: She is alert and oriented to person, place, and time. Psychiatric:         Mood and Affect: Mood normal.         Behavior: Behavior normal.         Labs:   No results found for: "HGBA1C"  Lab Results   Component Value Date    CREATININE 0.93 05/08/2023    BUN 9 05/08/2023    K 3.4 (L) 05/08/2023     05/08/2023    CO2 25 05/08/2023     eGFR   Date Value Ref Range Status   05/08/2023 76 ml/min/1.73sq m Final     No results found for: "CHOL", "HDL", "LDL", "TRIG", "CHOLHDL"  No results found for: "ALT", "AST", "GGT", "ALKPHOS", "BILITOT"  No results found for: "[id number]"  Lab Results   Component Value Date    FREET4 0.96 07/26/2023         There are no Patient Instructions on file for this visit. Discussed with the patient and all questioned fully answered. She will call me if any problems arise.

## 2023-08-18 NOTE — ASSESSMENT & PLAN NOTE
In April her level was 31. We discussed she may benefit from having a vitamin D level around 40. Since she is taking 2000 IU/day, she can increase this to 4000 every other day. Repeat vitamin D level ordered for 3 months.

## 2023-08-18 NOTE — ASSESSMENT & PLAN NOTE
She started taking [place] thyroid again by mistake with last set of labs which showed overreplacement of thyroid hormone. She started taking Synthroid again 1 week ago after realizing her mistake. - repeat lab work in 6 weeks.  Orders placed

## 2023-08-18 NOTE — ASSESSMENT & PLAN NOTE
She has had imaging for this in the past 2013/2014 in [place] which did not require biopsy. She has the results at home. She will send them to us. Clinically she is asymptomatic.

## 2023-11-09 ENCOUNTER — OFFICE VISIT (OUTPATIENT)
Dept: FAMILY MEDICINE CLINIC | Facility: CLINIC | Age: 43
End: 2023-11-09
Payer: COMMERCIAL

## 2023-11-09 VITALS
HEART RATE: 70 BPM | SYSTOLIC BLOOD PRESSURE: 110 MMHG | HEIGHT: 64 IN | DIASTOLIC BLOOD PRESSURE: 72 MMHG | TEMPERATURE: 98.8 F | WEIGHT: 208 LBS | RESPIRATION RATE: 16 BRPM | BODY MASS INDEX: 35.51 KG/M2 | OXYGEN SATURATION: 98 %

## 2023-11-09 DIAGNOSIS — Z12.31 SCREENING MAMMOGRAM FOR BREAST CANCER: ICD-10-CM

## 2023-11-09 DIAGNOSIS — Z11.3 SCREEN FOR STD (SEXUALLY TRANSMITTED DISEASE): ICD-10-CM

## 2023-11-09 DIAGNOSIS — L21.9 SEBORRHEIC ECZEMA: ICD-10-CM

## 2023-11-09 DIAGNOSIS — N76.0 BV (BACTERIAL VAGINOSIS): ICD-10-CM

## 2023-11-09 DIAGNOSIS — Z00.00 ENCOUNTER FOR ANNUAL PHYSICAL EXAM: Primary | ICD-10-CM

## 2023-11-09 DIAGNOSIS — B96.89 BV (BACTERIAL VAGINOSIS): ICD-10-CM

## 2023-11-09 DIAGNOSIS — K21.9 GASTROESOPHAGEAL REFLUX DISEASE, UNSPECIFIED WHETHER ESOPHAGITIS PRESENT: ICD-10-CM

## 2023-11-09 PROCEDURE — 99396 PREV VISIT EST AGE 40-64: CPT | Performed by: FAMILY MEDICINE

## 2023-11-09 RX ORDER — METRONIDAZOLE 7.5 MG/G
1 GEL VAGINAL AS NEEDED
Qty: 70 G | Refills: 2 | Status: SHIPPED | OUTPATIENT
Start: 2023-11-09

## 2023-11-09 NOTE — PROGRESS NOTES
[facility] PRACTICE    NAME: Gina Leger  AGE: 37 y.o. SEX: female  : 1980     DATE: 2023     Assessment and Plan:     Here for annual physical.  Recently developed infidelity. Has been seeing a therapist where plans to continue. STD panel requested but denies any symptoms. Otic drops and metronidazole vaginal gel refilled for recurrent BV and eczema. Mammogram due. Recommend over-the-counter Protonix for 2 weeks to alleviate GERD sx. Also recommend anti-reflux diet. Mg suggested for muscle tightness and sleep ( 400 mg of mg gluconate). Received flu shot this season. RTC in 1 year or sooner as needed    Problem List Items Addressed This Visit       BV (bacterial vaginosis)    Relevant Medications    metroNIDAZOLE (METROGEL) 0.75 % vaginal gel    Seborrheic eczema    Relevant Medications    neomycin-polymyxin-hydrocortisone (CORTISPORIN) otic solution    Encounter for annual physical exam - Primary     Other Visit Diagnoses       Screen for STD (sexually transmitted disease)        Relevant Orders    RPR-Syphilis Screening (Total Syphilis IGG/IGM)    HIV 1/2 AG/AB w Reflex [facility] for 2 yr old and above    Chlamydia/GC amplified DNA by PCR    Screening mammogram for breast cancer        Relevant Orders    Mammo screening bilateral w cad    Gastroesophageal reflux disease, unspecified whether esophagitis present                Immunizations and preventive care screenings were discussed with patient today. Appropriate education was printed on patient's after visit summary. Counseling:  Dental Health: discussed importance of regular tooth brushing, flossing, and dental visits. Exercise: the importance of regular exercise/physical activity was discussed. Recommend exercise 3-5 times per week for at least 30 minutes. BMI Counseling: Body mass index is 35.7 kg/m².  The BMI is above normal. Nutrition recommendations include encouraging healthy choices of fruits and vegetables, decreasing fast food intake, moderation in carbohydrate intake, increasing intake of lean protein, reducing intake of saturated and trans fat and reducing intake of cholesterol. Exercise recommendations include moderate physical activity 150 minutes/week. No pharmacotherapy was ordered. Rationale for BMI follow-up plan is due to patient being overweight or obese. Depression Screening and Follow-up Plan: Patient was screened for depression during today's encounter. They screened negative with a PHQ-2 score of 2. No follow-ups on file. Chief Complaint:     Chief Complaint   Patient presents with    Physical Exam    Care Gap Request     PHQ      History of Present Illness:     Adult Annual Physical   Patient here for a comprehensive physical exam.  Pt was doing IVF last year and unfortunately loss both embryos  Recently  with partner after finding out he was cheating. Requesting STD screen  Trying to stay strong   Has lost > 20 lbs with diet and exercise  Recently developed GERD symptoms and right shoulder pain with numbness and tingling in the right arm    Diet and Physical Activity  Diet/Nutrition: started herbalife this week but has been limiting her sweets and carbs. Need to increase vegetable  Exercise:  3 x a week, goal is 5 days . Depression Screening  PHQ-2/9 Depression Screening    Little interest or pleasure in doing things: 1 - several days  Feeling down, depressed, or hopeless: 1 - several days  PHQ-2 Score: 2  PHQ-2 Interpretation: Negative depression screen       General Health  Sleep:  4-6 hours . Hearing: normal - bilateral.  Vision: no vision problems. Dental:  last visit was in May and next appt is in 2 weeks . /GYN Health  Patient is: premenopausal  Last menstrual period: 10/25/23 ( first day). Spotted on Monday which is unsual  Contraceptive method:  none .        Review of Systems:     Review of Systems   Past Medical History:     Past Medical History:   Diagnosis Date    BV (bacterial vaginosis)     Hashimoto's disease     Infertility, female     Iron deficiency anemia     Vitamin D deficiency       Past Surgical History:     Past Surgical History:   Procedure Laterality Date     SECTION      WISDOM TOOTH EXTRACTION        Social History:     Social History     Socioeconomic History    Marital status: Registered Domestic Partner     Spouse name: None    Number of children: None    Years of education: None    Highest education level: None   Occupational History    None   Tobacco Use    Smoking status: Never     Passive exposure: Never    Smokeless tobacco: Never   Vaping Use    Vaping Use: Never used   Substance and Sexual Activity    Alcohol use: Not Currently     Comment: socially    Drug use: Never    Sexual activity: Yes     Partners: Male     Birth control/protection: None   Other Topics Concern    None   Social History Narrative    None     Social Determinants of Health     Financial Resource Strain: Not on file   Food Insecurity: Not on file   Transportation Needs: Not on file   Physical Activity: Not on file   Stress: Not on file   Social Connections: Not on file   Intimate Partner Violence: Not on file   Housing Stability: Not on file      Family History:     Family History   Problem Relation Age of Onset    Thyroid disease unspecified Mother     Hyperlipidemia Father     Hypertension Father     Diabetes type II Paternal Grandfather       Current Medications:     Current Outpatient Medications   Medication Sig Dispense Refill    Cholecalciferol (Vitamin D3) 1.25 MG (73339 UT) TABS once a week      fluconazole (DIFLUCAN) 200 mg tablet Take by mouth if needed      Iron-Vitamin C (Vitron-C)  MG TABS Take  mg by mouth in the morning 30 tablet 0    metroNIDAZOLE (METROGEL) 0.75 % vaginal gel Insert 1 Application into the vagina as needed (discharge) use once daily during flares 70 g 2    neomycin-polymyxin-hydrocortisone (CORTISPORIN) otic solution Administer 3 drops into the left ear every 8 (eight) hours 10 mL 1    Synthroid 150 MCG tablet Take 1 tablet (150 mcg total) by mouth daily 31 tablet 2     No current facility-administered medications for this visit. Allergies: Allergies   Allergen Reactions    [place] (Diagnostic) - Food Allergy Itching and Other (See Comments)      Physical Exam:     /72 (BP Location: Left arm, Patient Position: Sitting, Cuff Size: Large)   Pulse 70   Temp 98.8 °F (37.1 °C) (Tympanic)   Resp 16   Ht 5' 4" (1.626 m)   Wt 94.3 kg (208 lb)   SpO2 98%   BMI 35.70 kg/m²     Physical Exam  Vitals reviewed. Constitutional:       General: She is not in acute distress. Appearance: Normal appearance. She is not ill-appearing. HENT:      Head: Normocephalic and atraumatic. Right Ear: Tympanic membrane normal.      Mouth/Throat:      Mouth: Mucous membranes are moist.   Eyes:      Extraocular Movements: Extraocular movements intact. Neck:      Thyroid: No thyroid mass, thyromegaly or thyroid tenderness. Cardiovascular:      Rate and Rhythm: Normal rate and regular rhythm. Heart sounds: No murmur heard. Pulmonary:      Effort: Pulmonary effort is normal. No respiratory distress. Breath sounds: Normal breath sounds. Abdominal:      General: There is no distension. Palpations: Abdomen is soft. There is no mass. Tenderness: There is abdominal tenderness (epigastric). There is no guarding or rebound. Hernia: No hernia is present. Musculoskeletal:      Right shoulder: Tenderness present. No bony tenderness. Cervical back: No tenderness. Right lower leg: No edema. Left lower leg: No edema. Comments: Hypertonic trapezius muscle   Lymphadenopathy:      Cervical: No cervical adenopathy. Neurological:      Mental Status: She is alert and oriented to person, place, and time. Motor: No weakness. Psychiatric:         Mood and Affect: Mood normal.         Behavior: Behavior normal.         Thought Content:  Thought content normal.          Len Vizcaino MD  [address] [place] [address]

## 2024-01-29 ENCOUNTER — TELEPHONE (OUTPATIENT)
Dept: HEMATOLOGY ONCOLOGY | Facility: CLINIC | Age: 44
End: 2024-01-29

## 2024-01-29 NOTE — TELEPHONE ENCOUNTER
Spoke to pt life partner regarding pts labs needing to be completed prior to appt. Pt life partner verbalized understanding.

## 2024-01-30 ENCOUNTER — PATIENT MESSAGE (OUTPATIENT)
Dept: HEMATOLOGY ONCOLOGY | Facility: CLINIC | Age: 44
End: 2024-01-30

## 2024-02-01 ENCOUNTER — TELEPHONE (OUTPATIENT)
Dept: HEMATOLOGY ONCOLOGY | Facility: CLINIC | Age: 44
End: 2024-02-01

## 2024-02-01 NOTE — TELEPHONE ENCOUNTER
Lvm to pt regarding appt needing to be rescheduled due to pt not having labs drawn. New appt rescheduled for 2/27/24 at 12:30 with Licha in [employer]. Provided hopeline phone number in case of any questions/concerns.

## 2024-02-27 ENCOUNTER — TELEPHONE (OUTPATIENT)
Dept: HEMATOLOGY ONCOLOGY | Facility: CLINIC | Age: 44
End: 2024-02-27

## 2024-02-27 NOTE — TELEPHONE ENCOUNTER
Appointment Change  Cancel, Reschedule, Change to Virtual      Who are you speaking with? Patient   If it is not the patient, is the caller listed on the communication consent form? N/A   Which provider is the appointment scheduled with? JULIA Driscoll   When was the original appointment scheduled?    Please list date and time 2/27/24 @ 12:30pm   At which location is the appointment scheduled to take place? [facility]   Was the appointment rescheduled?     Was the appointment changed from an in person visit to a virtual visit?    If so, please list the details of the change. Yes 3/15/24 @ 8:30am   What is the reason for the appointment change? Work Conflict       Was STAR transport scheduled? no   Does STAR transport need to be scheduled for the new visit (if applicable) no   Does the patient need an infusion appointment rescheduled? no   Does the patient have an upcoming infusion appointment scheduled? If so, when? no   Is the patient undergoing chemotherapy? no   For appointments cancelled with less than 24 hours:  Was the no-show policy reviewed? yes

## 2024-02-29 ENCOUNTER — TELEPHONE (OUTPATIENT)
Dept: BARIATRICS | Facility: CLINIC | Age: 44
End: 2024-02-29

## 2024-03-05 ENCOUNTER — OFFICE VISIT (OUTPATIENT)
Dept: BARIATRICS | Facility: CLINIC | Age: 44
End: 2024-03-05
Payer: COMMERCIAL

## 2024-03-05 VITALS
HEIGHT: 64 IN | TEMPERATURE: 98.1 F | WEIGHT: 227.2 LBS | BODY MASS INDEX: 38.79 KG/M2 | SYSTOLIC BLOOD PRESSURE: 130 MMHG | DIASTOLIC BLOOD PRESSURE: 91 MMHG | RESPIRATION RATE: 16 BRPM | HEART RATE: 75 BPM

## 2024-03-05 DIAGNOSIS — Z91.89 RISK FACTORS FOR OBSTRUCTIVE SLEEP APNEA: Primary | ICD-10-CM

## 2024-03-05 DIAGNOSIS — E66.9 OBESITY, CLASS II, BMI 35-39.9: ICD-10-CM

## 2024-03-05 DIAGNOSIS — E06.3 HASHIMOTO'S THYROIDITIS: Primary | ICD-10-CM

## 2024-03-05 DIAGNOSIS — E04.1 THYROID NODULE: ICD-10-CM

## 2024-03-05 PROCEDURE — 99204 OFFICE O/P NEW MOD 45 MIN: CPT | Performed by: PHYSICIAN ASSISTANT

## 2024-03-05 RX ORDER — TIRZEPATIDE 2.5 MG/.5ML
2.5 INJECTION, SOLUTION SUBCUTANEOUS WEEKLY
Qty: 2 ML | Refills: 0 | Status: SHIPPED | OUTPATIENT
Start: 2024-03-05 | End: 2024-04-02

## 2024-03-05 RX ORDER — CHOLECALCIFEROL (VITAMIN D3) 50 MCG
TABLET ORAL
COMMUNITY

## 2024-03-05 NOTE — PROGRESS NOTES
Assessment/Plan:    Obesity, Class II, BMI 35-39.9  -Discussed options of HealthyCORE-Intensive Lifestyle Intervention Program, Very Low Calorie Diet-VLCD, and Conservative Program and the role of weight loss medications.  -she would be interested in surgery however does not qualify.  She does believe she has JOSE and will go for test  -Initial weight loss goal of 5-10% weight loss for improved health  -Screening labs and records reviewed from prior  - STOP BANG-4/8    -Patient is interested in pursuing Conservative Program    Goals:  -Food log (ie.) www."[email or web address]1300  - To drink at least 64oz of water daily.No sugary beverages.  -recommend setting weekly goals such as starting exercise week 1 and if completing that then increasing water intake  -recommend more meal prepping    Discussed medication options. To start on zepbound. They have tried more than 6 months of lifestyle modifications including diet and activity changes and has had insignificant weight loss of less than 1 lb a week. Patient denies personal and family history of MCT and MEN2 tumors. Patient denies personal history of pancreatitis. Side effects discussed but not limited to diarrhea, bloating, constipation, GI upset, heartburn, increased heart rate, headache, low blood sugar, fatigue and dizziness. Titration and medication administration discussed.  Medication agreement signed. Discussed not getting pregnant while taking medication    Initial Weight:227.2  Goal Weight:145lb        Thyroid nodule  Has had ultrasound and was not found to need biopsy.       Follow up in approximately  6 week nurse visit, RD visit and 4 months  with Non-Surgical Physician/Advanced Practitioner.  I have spent a total time of 46 minutes on 03/05/24 in caring for this patient including Diagnostic results, Prognosis, Risks and benefits of tx options, Instructions for management, Patient and family education, Importance of tx compliance,  Risk factor reductions, Impressions, Counseling / Coordination of care, Documenting in the medical record, Reviewing / ordering tests, medicine, procedures  , and Obtaining or reviewing history  .      Diagnoses and all orders for this visit:    Risk factors for obstructive sleep apnea  -     Ambulatory Referral to Sleep Medicine; Future  -     Lipid panel; Future    Obesity, Class II, BMI 35-39.9  -     Ambulatory Referral to Sleep Medicine; Future  -     Lipid panel; Future  -     tirzepatide (Zepbound) 2.5 mg/0.5 mL auto-injector; Inject 0.5 mL (2.5 mg total) under the skin once a week for 28 days    Thyroid nodule    Other orders  -     Cholecalciferol (Vitamin D3 Super Strength) 50 MCG (2000 UT) TABS; Take by mouth          Subjective:   Chief Complaint   Patient presents with    Consult     MWM- Consult, GW 145lb       Patient ID: Marisa Early  is a 43 y.o. female with excess weight/obesity here to pursue weight management.    Past Medical History:   Diagnosis Date    BV (bacterial vaginosis)     Hashimoto's disease     Infertility, female     Iron deficiency anemia     Vitamin D deficiency        HPI: Here for MWM consult  She was taking compounded semaglutide and was down to 208lb.  She developed a kidney stone and stopped it.Her insurance did not cover weight loss meds but now does    She did herbalife plan 2 shakes, lunch and 2 snacks . Was doing weights and cardio 4 x week befroe but stopped everything and now gaining weight. Does feel she is an emotional eater    Obesity/Excess Weight:  Severity:  class I  Onset:  years    Modifiers: Diet and Exercise, Commercial Weight Loss Programs-ie. Weight Watchers, Vend[employer]ots, Nutrisystem, etc., and Prescription Weight Loss Medications. Was on phentermine prior and had dizzness prior  Contributing factors: Poor Food Choices, Insufficient Physical Activity, and Stress/Emotional Eating  Highest Weight: 235#  Lowest Weight: 148#     Hydration:water, coffee, rare  "soda  Alcohol: none  Exercise:none  Occupation:sedentary   Sleep:  Dining out/takeout:    Food Recall  Breakfast: tea or coffee or Vivi sizzli, toast and avocado toast  Snack: 0  Lunch: takeout-not healthy-sometimes [employer]  Snack: 0  Dinner: \"in a funk,\" not cooking lately  Snack: Maybe Protein drink    The following portions of the patient's history were reviewed and updated as appropriate: She  has a past medical history of BV (bacterial vaginosis), Hashimoto's disease, Infertility, female, Iron deficiency anemia, and Vitamin D deficiency.  She   Patient Active Problem List    Diagnosis Date Noted    Iron deficiency anemia 2023    Other fatigue 2023    Obesity, Class II, BMI 35-39.9 2023    Screening for HIV (human immunodeficiency virus) 10/12/2022    Infertility, female 10/12/2022    Encounter for annual physical exam 10/12/2022    Hypothyroidism due to Hashimoto's thyroiditis 2022    Less than 8 weeks gestation of pregnancy 2022    Thyroid nodule 2022    Encounter for screening mammogram for breast cancer 2022    Abnormal mammogram 2022    BV (bacterial vaginosis) 2022    Seborrheic eczema 2022    Anxiety 2022    Alopecia areata 2022    Anemia 2022    Vitamin D deficiency 2022    Hashimoto's thyroiditis 2011     She  has a past surgical history that includes [place] tooth extraction and  section.  Her family history includes Diabetes type II in her paternal grandfather; Hyperlipidemia in her father; Hypertension in her father; Thyroid disease unspecified in her mother.  She  reports that she has never smoked. She has never been exposed to tobacco smoke. She has never used smokeless tobacco. She reports that she does not currently use alcohol. She reports that she does not use drugs.  Current Outpatient Medications   Medication Sig Dispense Refill    Cholecalciferol (Vitamin D3 Super Strength) 50 MCG (2000 " UT) TABS Take by mouth      fluconazole (DIFLUCAN) 200 mg tablet Take by mouth as needed      Iron-Vitamin C (Vitron-C)  MG TABS Take  mg by mouth in the morning 30 tablet 0    metroNIDAZOLE (METROGEL) 0.75 % vaginal gel Insert 1 Application into the vagina as needed (discharge) use once daily during flares 70 g 2    neomycin-polymyxin-hydrocortisone (CORTISPORIN) otic solution Administer 3 drops into the left ear every 8 (eight) hours 10 mL 1    Synthroid 150 MCG tablet Take 1 tablet (150 mcg total) by mouth daily 31 tablet 2    tirzepatide (Zepbound) 2.5 mg/0.5 mL auto-injector Inject 0.5 mL (2.5 mg total) under the skin once a week for 28 days 2 mL 0    Cholecalciferol (Vitamin D3) 1.25 MG (46269 UT) TABS once a week (Patient not taking: Reported on 3/5/2024)       No current facility-administered medications for this visit.     Current Outpatient Medications on File Prior to Visit   Medication Sig    Cholecalciferol (Vitamin D3 Super Strength) 50 MCG (2000 UT) TABS Take by mouth    fluconazole (DIFLUCAN) 200 mg tablet Take by mouth as needed    Iron-Vitamin C (Vitron-C)  MG TABS Take  mg by mouth in the morning    metroNIDAZOLE (METROGEL) 0.75 % vaginal gel Insert 1 Application into the vagina as needed (discharge) use once daily during flares    neomycin-polymyxin-hydrocortisone (CORTISPORIN) otic solution Administer 3 drops into the left ear every 8 (eight) hours    Synthroid 150 MCG tablet Take 1 tablet (150 mcg total) by mouth daily    Cholecalciferol (Vitamin D3) 1.25 MG (58590 UT) TABS once a week (Patient not taking: Reported on 3/5/2024)     No current facility-administered medications on file prior to visit.     She is allergic to almond (diagnostic) - food allergy..    Review of Systems   Constitutional:  Positive for fatigue. Negative for fever.   Respiratory:  Negative for shortness of breath.    Cardiovascular:  Negative for chest pain and palpitations.   Gastrointestinal:   "Negative for abdominal pain, constipation, diarrhea and vomiting.   Genitourinary:  Negative for difficulty urinating.   Skin:  Negative for rash.   Neurological:  Negative for headaches.   Psychiatric/Behavioral:  Positive for sleep disturbance. Negative for dysphoric mood. The patient is not nervous/anxious.        Objective:    /91   Pulse 75   Temp 98.1 °F (36.7 °C)   Resp 16   Ht 5' 3.5\" (1.613 m)   Wt 103 kg (227 lb 3.2 oz)   BMI 39.62 kg/m²     Physical Exam  Vitals and nursing note reviewed.   Constitutional:       General: She is not in acute distress.     Appearance: She is well-developed. She is obese.   HENT:      Head: Normocephalic and atraumatic.   Eyes:      Conjunctiva/sclera: Conjunctivae normal.   Neck:      Thyroid: No thyromegaly.   Pulmonary:      Effort: Pulmonary effort is normal. No respiratory distress.   Skin:     Findings: No rash (visible).   Neurological:      Mental Status: She is alert and oriented to person, place, and time.   Psychiatric:         Behavior: Behavior normal.         "

## 2024-03-05 NOTE — ASSESSMENT & PLAN NOTE
-Discussed options of HealthyCORE-Intensive Lifestyle Intervention Program, Very Low Calorie Diet-VLCD, and Conservative Program and the role of weight loss medications.  -she would be interested in surgery however does not qualify.  She does believe she has JOSE and will go for test  -Initial weight loss goal of 5-10% weight loss for improved health  -Screening labs and records reviewed from prior  - STOP BANG-4/8    -Patient is interested in pursuing Conservative Program    Goals:  -Food log (ie.) [email or web address]  - To drink at least 64oz of water daily.No sugary beverages.  -recommend setting weekly goals such as starting exercise week 1 and if completing that then increasing water intake  -recommend more meal prepping    Discussed medication options. To start on zepbound. They have tried more than 6 months of lifestyle modifications including diet and activity changes and has had insignificant weight loss of less than 1 lb a week. Patient denies personal and family history of MCT and MEN2 tumors. Patient denies personal history of pancreatitis. Side effects discussed but not limited to diarrhea, bloating, constipation, GI upset, heartburn, increased heart rate, headache, low blood sugar, fatigue and dizziness. Titration and medication administration discussed.  Medication agreement signed. Discussed not getting pregnant while taking medication    Initial Weight:227.2  Goal Weight:145lb

## 2024-03-13 ENCOUNTER — TELEPHONE (OUTPATIENT)
Dept: HEMATOLOGY ONCOLOGY | Facility: CLINIC | Age: 44
End: 2024-03-13

## 2024-03-13 NOTE — PROGRESS NOTES
Weight Management Medical Nutrition Assessment    Marisa presented for a meal planning session. Today's weight is 226.9#. Was prescribed zepbound, not yet started. States she has a bad relationship with food and yo-yo dieting. Marisa is currently going through a separation and it has impacted her motivation to cook meals at home and exercise. Is currently not working with a therapist-- recommend she meet with . Marisa is not interested in tracking calories. Marisa will eat out 2-3 times a day and will drink ~8oz water per day. Excessive calories from takeout meals. We set goals today for her to work on until next follow up. Provided 1200 calorie week at a glance handout & snack list. Reviewed protein and hydration goals. Follow up in 1 mo.       Goals: water 30 oz per day, pack lunch/snacks daily    Patient seen by Medical Provider in past 6 months:  yes Michel Roman PA-C 3/5/24  Requested to schedule appointment with Medical Provider: No      Anthropometric Measurements  Start Weight (#): 226.9 (3/20/2024)  Current Weight (#): 226.9  TBW % Change from start weight:n/a  Ideal Body Weight (#):117.5  Goal Weight (#):145  Highest Weight: 235#  Lowest Weight: 148#    Weight Loss History  Previous weight loss attempts: Diet and Exercise, Commercial Weight Loss Programs-ie. Weight Watchers, [employer], Nutrisystem, etc., and Prescription Weight Loss Medications. Was on phentermine prior     Food and Nutrition Related History  Wake up: 4:30-4:45am   Bed Time: 10pm     Food Recall  Breakfast: 1 hr after thyroid pill- herballife green tea; then 8:30am Vivi breakfast sandwich   Snack: skip  Lunch: 12:30-1pm takeout with coworkers from [employer], [place]  Snack: skip  Dinner: gets takeout OR skip OR premier protein   Snack:       Beverages: 8 oz water, coffee, rare soda     Weekends:   Cravings:   Trouble area of day:     Frequency of Eating out: 2-3 times a day   Food restrictions:dairy and  almond  Cooking: self   Food Shopping: self    Physical Activity Intake  Activity: none, is thinking about starting up boxing. Was doing weights and cardio 4 x week before  Physical limitations/barriers to exercise: none    Estimated Needs  Energy  SECA: BMR:n/a      X 1.3 -1000 =  [employer] Needs: BMR : 1411   1-2# loss weekly sedentary:  994-1494             1-2# loss weekly lightly active: 3064-8558  Maintenance calories for sedentary activity level: 1994  Protein:64-80      (1.2-1.5g/kg IBW)  Fluid: 62     (35mL/kg IBW)    Nutrition Diagnosis  Yes;    Overweight/obesity  related to Excess energy intake as evidenced by  BMI more than normative standard for age and sex (obesity-grade II 35-39.9)       Nutrition Intervention    Nutrition Prescription  Calories:1200  Protein:65-80  Fluid:64oz     Meal Plan (Jesus/Pro/Carb) - not provided, patient not interested in tracking calories. visit spent goal setting  Breakfast:  Snack:  Lunch:  Snack:  Dinner:  Snack:    Nutrition Education:    Calorie controlled menu  Lean protein food choices  Healthy snack options  Food journaling tips      Nutrition Counseling:  Strategies: meal planning, portion sizes, healthy snack choices, hydration, fiber intake, protein intake, exercise, food journal      Monitoring and Evaluation:  Evaluation criteria:  Energy Intake  Meet protein needs  Maintain adequate hydration  Monitor weekly weight  Meal planning/preparation  Food journal   Decreased portions at mealtimes and snacks  Physical activity     Barriers to learning:none  Readiness to change: Preparation:  (Getting ready to change)   Comprehension: good  Expected Compliance: good

## 2024-03-15 ENCOUNTER — OFFICE VISIT (OUTPATIENT)
Dept: HEMATOLOGY ONCOLOGY | Facility: CLINIC | Age: 44
End: 2024-03-15
Payer: COMMERCIAL

## 2024-03-15 VITALS
SYSTOLIC BLOOD PRESSURE: 128 MMHG | OXYGEN SATURATION: 98 % | HEIGHT: 64 IN | TEMPERATURE: 97.6 F | BODY MASS INDEX: 38.76 KG/M2 | DIASTOLIC BLOOD PRESSURE: 76 MMHG | RESPIRATION RATE: 17 BRPM | WEIGHT: 227 LBS | HEART RATE: 90 BPM

## 2024-03-15 DIAGNOSIS — K59.03 DRUG-INDUCED CONSTIPATION: ICD-10-CM

## 2024-03-15 DIAGNOSIS — D53.9 NUTRITIONAL ANEMIA: ICD-10-CM

## 2024-03-15 DIAGNOSIS — D50.9 IRON DEFICIENCY ANEMIA, UNSPECIFIED IRON DEFICIENCY ANEMIA TYPE: Primary | ICD-10-CM

## 2024-03-15 DIAGNOSIS — E63.9 NUTRITIONAL DEFICIENCY: ICD-10-CM

## 2024-03-15 PROCEDURE — 99213 OFFICE O/P EST LOW 20 MIN: CPT

## 2024-03-15 RX ORDER — BACILLUS COAGULANS 1B CELL
65-125 CAPSULE ORAL EVERY OTHER DAY
Qty: 30 TABLET | Refills: 3 | Status: SHIPPED | OUTPATIENT
Start: 2024-03-15

## 2024-03-15 NOTE — PROGRESS NOTES
HEMATOLOGY / ONCOLOGY CLINIC FOLLOW UP NOTE    Primary Care Provider: Marian Chester MD  Referring Provider:    MRN: [id number]  : 1980    Reason for Encounter: Follow up iron deficiency anemia         Interval History: Patient presents for a follow up of her iron deficiency anemia.  Patient has PMH of Hashimoto's thyroiditis.  She is status post IV iron treatments, Venofer 300 mg x 4 doses (5/3/2023 to 2023).      She continues to have a heavy monthly menstrual cycle, lasts 3 days, with all three days heavy, having to change her pad/tampon every hour.  Since the infusions, she has been on oral iron supplements, however, reports she does not take them consistently and PICA (ice chips).  Patient endorses mild fatigue, intermittent dizziness.  Denies HAs, RLS, CP, SOB, palpitations.      Labs:  2024: Hgb 13.8, MCV 88, ferritin is 47, serum iron 72, iron saturation 22%, vitamin B12 857, folic acid 12.0    2023: Hgb 13.4, MCV 85, Ferritin 229, serum iron 62, % saturation 22, vitamin B12 1940, folate 11.5     2023: serum iron 25, % saturation 6, Ferritin 12. Most recent CBC 2022: Hgb 10.1, MCV 83, otherwise normal CBC. Bili 0.4, no renal insufficiency noted.     REVIEW OF SYSTEMS:  Please note that a 14-point review of systems was performed to include Constitutional, HEENT, Respiratory, CVS, GI, , Musculoskeletal, Integumentary, Neurologic, Rheumatologic, Endocrinologic, Psychiatric, Lymphatic, and Hematologic/Oncologic systems were reviewed and are negative unless otherwise stated in HPI. Positive and negative findings pertinent to this evaluation are incorporated into the history of present illness.      ECOG PS: 0    HPI:  Marisa Early was seen for initial consultation 2023 regarding iron deficiency anemia.  Patient has PMH of Hashimoto's thyroiditis. Patient recently moved to [place] from [place] in .  Patient reports she was found to be anemic with a very low ferritin  in 2008, she was placed on oral iron supplements at that time.  She stopped taking the supplements due to GI discomfort.       Patient has one child born in 2005.  Prior to pregnancy, her periods lasted 5 days, with days 1-3 the heaviest.  Currently, her periods last about 7 days, 4 days of which are heavy.  Patient is c/o of extreme fatigue, hair loss and HAs.  Denies PICA, RLS, lightheadedness.  Patient is not a vegetarian/vegan. She is a non-smoker. Drinks alcohol socially.       Most recent iron panel on 4/2023: serum iron 25, % saturation 6, Ferritin 12.  Most recent CBC 1/2022: Hgb 10.1, MCV 83, otherwise normal CBC.  Bili 0.4, no renal insufficiency noted.      5/3/2023 to 5/24/2023 - Venofer 300 mg x 4 doses    8/3/2023 - started on oral iron supplements (Vitron-C)    PROBLEM LIST:  Patient Active Problem List   Diagnosis    Alopecia areata    Anemia    Hashimoto's thyroiditis    Vitamin D deficiency    Encounter for screening mammogram for breast cancer    Abnormal mammogram    BV (bacterial vaginosis)    Seborrheic eczema    Anxiety    Hypothyroidism due to Hashimoto's thyroiditis    Less than 8 weeks gestation of pregnancy    Thyroid nodule    Screening for HIV (human immunodeficiency virus)    Infertility, female    Encounter for annual physical exam    Other fatigue    Obesity, Class II, BMI 35-39.9    Iron deficiency anemia       Assessment / Plan: We reviewed patient's labs in detail.  Recommend that she continue to take Vitron-C consistently.  She reports she does have some mild constipation.  We agreed to a plan of Vitron-C every other day with Colace.  She will continue taking vitamin B12 supplements.  We will see patient back in 4 months with repeat labs (CBCD, iron panel, vitamin B-12 and folate).  If her counts remain stable on oral iron supplements, we will discharge her back to her PCP to be monitored every 6 months.  Patient is agreeable with plan.  She is aware to contact us for any  additional questions/concerns or worsening symptoms.        I spent 20 minutes on chart review, face to face counseling time, coordination of care and documentation.    Past Medical History:   has a past medical history of BV (bacterial vaginosis), Hashimoto's disease, Infertility, female, Iron deficiency anemia, and Vitamin D deficiency.    PAST SURGICAL HISTORY:   has a past surgical history that includes [place] tooth extraction and  section.    CURRENT MEDICATIONS  Current Outpatient Medications   Medication Sig Dispense Refill    Cholecalciferol (Vitamin D3 Super Strength) 50 MCG (2000 UT) TABS Take by mouth      Cholecalciferol (Vitamin D3) 1.25 MG (91290 UT) TABS once a week      docusate sodium (COLACE) 50 mg capsule Take 1 capsule (50 mg total) by mouth in the morning 90 capsule 0    fluconazole (DIFLUCAN) 200 mg tablet Take by mouth as needed      Iron-Vitamin C (Vitron-C)  MG TABS Take  mg by mouth every other day 30 tablet 3    metroNIDAZOLE (METROGEL) 0.75 % vaginal gel Insert 1 Application into the vagina as needed (discharge) use once daily during flares 70 g 2    neomycin-polymyxin-hydrocortisone (CORTISPORIN) otic solution Administer 3 drops into the left ear every 8 (eight) hours 10 mL 1    Synthroid 150 MCG tablet Take 1 tablet (150 mcg total) by mouth daily 31 tablet 2    tirzepatide (Zepbound) 2.5 mg/0.5 mL auto-injector Inject 0.5 mL (2.5 mg total) under the skin once a week for 28 days 2 mL 0     No current facility-administered medications for this visit.     [unfilled]    SOCIAL HISTORY:   reports that she has never smoked. She has never been exposed to tobacco smoke. She has never used smokeless tobacco. She reports that she does not currently use alcohol. She reports that she does not use drugs.     FAMILY HISTORY:  family history includes Diabetes type II in her paternal grandfather; Hyperlipidemia in her father; Hypertension in her father; Thyroid disease unspecified in  "her mother.     ALLERGIES:  is allergic to almond (diagnostic) - food allergy.      Physical Exam:  Vital Signs:   Visit Vitals  /76 (BP Location: Left arm, Patient Position: Sitting, Cuff Size: Adult)   Pulse 90   Temp 97.6 °F (36.4 °C)   Resp 17   Ht 5' 3.5\" (1.613 m)   Wt 103 kg (227 lb)   SpO2 98%   BMI 39.58 kg/m²   OB Status Having periods   Smoking Status Never   BSA 2.05 m²     Body mass index is 39.58 kg/m².  Body surface area is 2.05 meters squared.    GEN: Alert, awake oriented x3, in no acute distress  HEENT- No pallor, icterus, cyanosis, no oral mucosal lesions,   LAD - no palpable cervical, clavicle, axillary, inguinal LAD  Heart- normal S1 S2, regular rate and rhythm, No murmur, rubs.   Lungs- clear breathing sound bilateral.   Abdomen- soft, Non tender, bowel sounds present  Extremities- No cyanosis, clubbing, edema  Neuro- No focal neurological deficit    Labs:  Lab Results   Component Value Date    WBC 10.2 07/26/2023    HGB 13.4 07/26/2023    HCT 41.0 07/26/2023    MCV 85 07/26/2023     07/26/2023     Lab Results   Component Value Date    SODIUM 137 05/08/2023    K 3.4 (L) 05/08/2023     05/08/2023    CO2 25 05/08/2023    AGAP 8 05/08/2023    BUN 9 05/08/2023    CREATININE 0.93 05/08/2023    GLUC 111 05/08/2023    CALCIUM 9.0 05/08/2023    EGFR 76 05/08/2023     "

## 2024-03-17 PROBLEM — E63.9 NUTRITIONAL DEFICIENCY: Status: ACTIVE | Noted: 2024-03-17

## 2024-03-17 PROBLEM — D53.9 NUTRITIONAL ANEMIA: Status: ACTIVE | Noted: 2022-04-06

## 2024-03-17 PROBLEM — K59.03 DRUG-INDUCED CONSTIPATION: Status: ACTIVE | Noted: 2024-03-17

## 2024-03-18 ENCOUNTER — TELEPHONE (OUTPATIENT)
Dept: BARIATRICS | Facility: CLINIC | Age: 44
End: 2024-03-18

## 2024-03-20 ENCOUNTER — CLINICAL SUPPORT (OUTPATIENT)
Dept: BARIATRICS | Facility: CLINIC | Age: 44
End: 2024-03-20

## 2024-03-20 VITALS — BODY MASS INDEX: 38.74 KG/M2 | HEIGHT: 64 IN | WEIGHT: 226.9 LBS

## 2024-03-20 DIAGNOSIS — R63.5 ABNORMAL WEIGHT GAIN: Primary | ICD-10-CM

## 2024-03-20 PROCEDURE — RECHECK

## 2024-03-20 PROCEDURE — WMDI30

## 2024-03-20 NOTE — TELEPHONE ENCOUNTER
PA for Zepbound    Submitted via    []CMM-KEY    [x][employer]-Case ID # [id number]   []Faxed to plan   []Other website    []Phone call Case ID #      Office notes sent, clinical questions answered. Awaiting determination    Turnaround time for your insurance to make a decision on your Prior Authorization can take 7-21 business days.

## 2024-03-21 NOTE — TELEPHONE ENCOUNTER
PA for zepbound excluded    Reason:This request was denied because you did not meet the following criteria requirements:  The requested medication and/or diagnosis are not a covered benefit and are excluded from coverage  in accordance with the terms and conditions of your plan benefit. Therefore, this request has been  administratively denied.        Message sent to office clinical pool Yes    Denial letter scanned into Media Yes    Appeal started No

## 2024-04-01 NOTE — PROGRESS NOTES
Behavioral Health Follow Up Note        Name: Marisa Early             Mental health and wellness -   Patient presents to the office today for a support visit. The pt reports she is only having one meal a day. She explained that she has not had any motivation and feels that she possibly is depressed. Discussed symptoms and reviewed Mh resource for the pt to  to link with therapy. The pt was open and receptive to calling. The pt explained that she has increased her water intake, but has not put into place recommendations given by the provider discussed areas she is struggling in and educated the pt on why eating only one meal is not healthy. Reviewed meal planning and food journaling to help understand food intake. Discussed how activity can be helpful for her Mh and provided suggestions.     Reviewed and discussed  Adequate hydration  Exercise  Meal planning and preparation  Lifestyle changes  Possible problems with poor eating habits  Practice mindful eating.    Setting aside time to eat slowly, and savor food    Goal: 1-link with MH services  Next appointment: 4/17/24 RODOLFO

## 2024-04-03 ENCOUNTER — CLINICAL SUPPORT (OUTPATIENT)
Dept: BARIATRICS | Facility: CLINIC | Age: 44
End: 2024-04-03

## 2024-04-03 DIAGNOSIS — R63.5 ABNORMAL WEIGHT GAIN: Primary | ICD-10-CM

## 2024-04-03 PROCEDURE — RECHECK

## 2024-04-15 ENCOUNTER — TELEPHONE (OUTPATIENT)
Dept: BARIATRICS | Facility: CLINIC | Age: 44
End: 2024-04-15

## 2024-04-23 ENCOUNTER — TELEPHONE (OUTPATIENT)
Dept: BARIATRICS | Facility: CLINIC | Age: 44
End: 2024-04-23

## 2024-04-25 ENCOUNTER — TELEPHONE (OUTPATIENT)
Dept: FAMILY MEDICINE CLINIC | Facility: CLINIC | Age: 44
End: 2024-04-25

## 2024-04-25 LAB
BASOPHILS # BLD AUTO: 0 X10E3/UL (ref 0–0.2)
BASOPHILS NFR BLD AUTO: 0 %
EOSINOPHIL # BLD AUTO: 0.4 X10E3/UL (ref 0–0.4)
EOSINOPHIL NFR BLD AUTO: 4 %
ERYTHROCYTE [DISTWIDTH] IN BLOOD BY AUTOMATED COUNT: 12.1 % (ref 11.7–15.4)
FOLATE SERPL-MCNC: 7.5 NG/ML
HCT VFR BLD AUTO: 41.7 % (ref 34–46.6)
HGB BLD-MCNC: 13.6 G/DL (ref 11.1–15.9)
IMM GRANULOCYTES # BLD: 0.1 X10E3/UL (ref 0–0.1)
IMM GRANULOCYTES NFR BLD: 1 %
IRON SATN MFR SERPL: 16 % (ref 15–55)
IRON SERPL-MCNC: 46 UG/DL (ref 27–159)
LYMPHOCYTES # BLD AUTO: 3.6 X10E3/UL (ref 0.7–3.1)
LYMPHOCYTES NFR BLD AUTO: 36 %
MCH RBC QN AUTO: 28.5 PG (ref 26.6–33)
MCHC RBC AUTO-ENTMCNC: 32.6 G/DL (ref 31.5–35.7)
MCV RBC AUTO: 87 FL (ref 79–97)
MONOCYTES # BLD AUTO: 0.7 X10E3/UL (ref 0.1–0.9)
MONOCYTES NFR BLD AUTO: 7 %
NEUTROPHILS # BLD AUTO: 5.1 X10E3/UL (ref 1.4–7)
NEUTROPHILS NFR BLD AUTO: 52 %
PLATELET # BLD AUTO: 312 X10E3/UL (ref 150–450)
RBC # BLD AUTO: 4.78 X10E6/UL (ref 3.77–5.28)
T4 FREE SERPL-MCNC: 1.26 NG/DL (ref 0.82–1.77)
TIBC SERPL-MCNC: 290 UG/DL (ref 250–450)
TSH SERPL DL<=0.005 MIU/L-ACNC: 0.66 UIU/ML (ref 0.45–4.5)
UIBC SERPL-MCNC: 244 UG/DL (ref 131–425)
VIT B12 SERPL-MCNC: 931 PG/ML (ref 232–1245)
WBC # BLD AUTO: 9.9 X10E3/UL (ref 3.4–10.8)

## 2024-04-26 ENCOUNTER — OFFICE VISIT (OUTPATIENT)
Dept: FAMILY MEDICINE CLINIC | Facility: CLINIC | Age: 44
End: 2024-04-26
Payer: COMMERCIAL

## 2024-04-26 VITALS
SYSTOLIC BLOOD PRESSURE: 118 MMHG | TEMPERATURE: 96.9 F | HEIGHT: 64 IN | BODY MASS INDEX: 39.34 KG/M2 | WEIGHT: 230.4 LBS | OXYGEN SATURATION: 98 % | HEART RATE: 76 BPM | DIASTOLIC BLOOD PRESSURE: 80 MMHG | RESPIRATION RATE: 16 BRPM

## 2024-04-26 DIAGNOSIS — F32.9 CURRENT EPISODE OF MAJOR DEPRESSIVE DISORDER WITHOUT PRIOR EPISODE, UNSPECIFIED DEPRESSION EPISODE SEVERITY: Primary | ICD-10-CM

## 2024-04-26 DIAGNOSIS — H60.501 ACUTE OTITIS EXTERNA OF RIGHT EAR, UNSPECIFIED TYPE: ICD-10-CM

## 2024-04-26 DIAGNOSIS — N93.9 ABNORMAL VAGINAL BLEEDING: ICD-10-CM

## 2024-04-26 PROCEDURE — 3725F SCREEN DEPRESSION PERFORMED: CPT | Performed by: FAMILY MEDICINE

## 2024-04-26 PROCEDURE — 99214 OFFICE O/P EST MOD 30 MIN: CPT | Performed by: FAMILY MEDICINE

## 2024-04-26 RX ORDER — BUPROPION HYDROCHLORIDE 150 MG/1
150 TABLET ORAL EVERY MORNING
Qty: 30 TABLET | Refills: 1 | Status: SHIPPED | OUTPATIENT
Start: 2024-04-26 | End: 2024-10-23

## 2024-04-26 RX ORDER — ACETIC ACID 20.65 MG/ML
SOLUTION AURICULAR (OTIC)
COMMUNITY
Start: 2024-04-10

## 2024-04-26 RX ORDER — AMOXICILLIN AND CLAVULANATE POTASSIUM 875; 125 MG/1; MG/1
1 TABLET, FILM COATED ORAL EVERY 12 HOURS SCHEDULED
Qty: 20 TABLET | Refills: 0 | Status: SHIPPED | OUTPATIENT
Start: 2024-04-26 | End: 2024-05-06

## 2024-04-26 RX ORDER — OFLOXACIN 3 MG/ML
SOLUTION AURICULAR (OTIC)
COMMUNITY
Start: 2024-04-10

## 2024-04-26 NOTE — PROGRESS NOTES
Name: Marisa Ealry      : 1980      MRN: [id number]  Encounter Provider: Marian Chester MD  Encounter Date: 2024   Encounter department: [facility]    Assessment & Plan     1. Current episode of major depressive disorder without prior episode, unspecified depression episode severity  Comments:  We discussed medical managment of depression. Based on her score and histor, she has severe depression. There are also events that have precipitated her depression. Currently seeing a therapist which agree will be beneficial. Given her weight concerns, a weight neutral drug is preferred. Start Wellbutrin 150 mg daily. Denies history of eating ds or seizures. Call precautions. F.u in 4-6 weeks   Orders:  -     buPROPion (WELLBUTRIN XL) 150 mg 24 hr tablet; Take 1 tablet (150 mg total) by mouth every morning    2. Acute otitis externa of right ear, unspecified type  Comments:  Recently treated with ear drops at  but now has inflammaed ear canal with copious amounts of opaque drianage in the ear. Unable to visualize TM and would not suggest lavage as this may potentially rupture a possibly infected ear drug. Oral abx ordered and told to stop abx drop . Pt will follow up to reassess her ear.   Orders:  -     amoxicillin-clavulanate (AUGMENTIN) 875-125 mg per tablet; Take 1 tablet by mouth every 12 (twelve) hours for 10 days    3. Abnormal vaginal bleeding  Comments:  intermittent spoting with intercourse. No pain. Last pap was normal. Will perform speculum exam at the next appt if sx persist           Subjective      Here to discuss medical management of depression   Has suffered with depression for years but has gotten worse in the last 3 years   Started seeing therapist who recently suggested she consider medications   Grieving loss her child who had trisomy 18 and unfortunately miscarried   No prior [facility] admission. Has not seen psych nor been on medications in the past. No SI/HI      Recently diagnosed with ear infection  Prescribed ear drops  Pain resolved but is unable to hear out of the ear     PHQ-2/9 Depression Screening    Little interest or pleasure in doing things: 3 - nearly every day  Feeling down, depressed, or hopeless: 3 - nearly every day  Trouble falling or staying asleep, or sleeping too much: 2 - more than   half the days  Feeling tired or having little energy: 3 - nearly every day  Poor appetite or overeating: 3 - nearly every day  Feeling bad about yourself - or that you are a failure or have let   yourself or your family down: 3 - nearly every day  Trouble concentrating on things, such as reading the newspaper or watching   television: 3 - nearly every day  Moving or speaking so slowly that other people could have noticed. Or the   opposite - being so fidgety or restless that you have been moving around a   lot more than usual: 0 - not at all  Thoughts that you would be better off dead, or of hurting yourself in some   way: 0 - not at all  PHQ-2 Score: 6  PHQ-2 Interpretation: POSITIVE depression screen  PHQ-9 Score: 20  PHQ-9 Interpretation: Severe depression             Review of Systems   Constitutional:  Positive for unexpected weight change. Negative for fever.   HENT:  Positive for ear discharge, ear pain (resolving) and hearing loss.    Psychiatric/Behavioral:  Positive for decreased concentration and dysphoric mood. Negative for self-injury and suicidal ideas.        Current Outpatient Medications on File Prior to Visit   Medication Sig    acetic acid (VOSOL) 2 % otic solution     Cholecalciferol (Vitamin D3 Super Strength) 50 MCG (2000 UT) TABS Take by mouth    Cholecalciferol (Vitamin D3) 1.25 MG (43350 UT) TABS once a week    docusate sodium (COLACE) 50 mg capsule Take 1 capsule (50 mg total) by mouth in the morning    fluconazole (DIFLUCAN) 200 mg tablet Take by mouth as needed    Iron-Vitamin C (Vitron-C)  MG TABS Take  mg by mouth every other day  "   metroNIDAZOLE (METROGEL) 0.75 % vaginal gel Insert 1 Application into the vagina as needed (discharge) use once daily during flares    neomycin-polymyxin-hydrocortisone (CORTISPORIN) otic solution Administer 3 drops into the left ear every 8 (eight) hours    ofloxacin (FLOXIN) 0.3 % otic solution     Synthroid 150 MCG tablet Take 1 tablet (150 mcg total) by mouth daily       Objective     /80 (BP Location: Left arm, Patient Position: Sitting, Cuff Size: Large)   Pulse 76   Temp (!) 96.9 °F (36.1 °C) (Tympanic)   Resp 16   Ht 5' 3.5\" (1.613 m)   Wt 105 kg (230 lb 6.4 oz)   SpO2 98%   BMI 40.17 kg/m²     Physical Exam  Constitutional:       General: She is not in acute distress.     Appearance: Normal appearance. She is obese. She is not ill-appearing.   HENT:      Head: Normocephalic and atraumatic.      Right Ear: Decreased hearing noted. Drainage and swelling present. No tenderness. There is impacted cerumen. No mastoid tenderness.      Left Ear: Tympanic membrane normal. There is no impacted cerumen.   Cardiovascular:      Rate and Rhythm: Normal rate and regular rhythm.   Pulmonary:      Effort: Pulmonary effort is normal.   Skin:     General: Skin is warm.   Neurological:      Mental Status: She is alert and oriented to person, place, and time.   Psychiatric:         Attention and Perception: Attention normal.         Mood and Affect: Mood is depressed.         Speech: Speech normal.         Behavior: Behavior is cooperative.         Thought Content: Thought content normal.         Cognition and Memory: Cognition normal.       Marian Chester MD    Depression Screening Follow-up Plan: Patient's depression screening was positive with a PHQ-2 score of 6. Their PHQ-9 score was 20. Patient assessed for underlying major depression. They have no active suicidal ideations. Brief counseling provided and recommend additional follow-up/re-evaluation next office visit. Continue regular follow-up with their " psychologist/therapist/psychiatrist who is managing their mental health condition(s).

## 2024-04-30 NOTE — PATIENT INSTRUCTIONS
Depression   AMBULATORY CARE:   Depression  is a mood disorder that causes feelings of sadness or hopelessness that do not go away. Depression may cause you to lose interest in things you used to enjoy. These feelings may interfere with your daily life.  Common signs and symptoms:   Appetite changes, or weight gain or loss    Trouble falling or staying asleep, or sleeping too much    Fatigue (being mentally and physically tired) or lack of energy    Feeling restless, irritable, or withdrawn    Feeling worthless, hopeless, discouraged, or guilty    Trouble concentrating, remembering things, doing daily tasks, or making decisions    Thoughts about hurting or killing yourself    Call your local emergency number (911 in the [place]) if:   You think about hurting yourself or someone else.    You have done something on purpose to hurt yourself.    Call your therapist or doctor if:   Your symptoms get worse or do not get better with treatment.    Your depression keeps you from doing your regular daily activities.    You have new symptoms since your last visit.    You have questions or concerns about your condition or care.    The following resources are available at any time to help you, if needed:   Contact a suicide prevention organization:        For the [employer] Suicide and Crisis Lifeline:     Call or text [employer]     Send a chat on https://[email or web address]/chat     Call 5-279-815-5682 (1-800-273-TALK)    For the Suicide Hotline, call 0-606-172-6714 (9-994-UNYYKFM)    For a list of international numbers: https://save.org/find-help/international-resources/  Treatment for depression  depends on how severe your symptoms are. You may need any of the following:  Cognitive behavioral therapy (CBT)  teaches you how to identify and change negative thought patterns.    Antidepressant medicine  may be given to decrease or manage symptoms. You may need to take this medicine for several weeks before they start working.    Self-care:   Talk to  someone about your depression.  Your healthcare provider may suggest counseling. You might feel more comfortable talking with a friend or family member about your depression. Choose someone you know will be supportive and encouraging.    Get regular physical activity.  Physical activity can lower your stress, improve your mood, and help you sleep better. Work with your healthcare provider to develop a plan that you enjoy.         Create a regular sleep schedule.  A routine can help you relax before bed. Listen to music, read, or do yoga. Try to go to bed and wake up at the same time every day. Sleep is important for emotional health.    Eat a variety of healthy foods.  Healthy foods include fruits, vegetables, whole-grain breads, low-fat dairy products, lean meats, fish, and cooked beans. A healthy meal plan is low in fat, salt, and added sugar.         Do not use alcohol, drugs, or nicotine products.  These can worsen depression or make it hard to manage. Talk to your therapist or healthcare provider if you use any of these products and need help to quit.    Follow up with your therapist or doctor as directed:  Your healthcare provider will monitor your progress at follow-up visits. Your provider will also monitor your medicine if you take antidepressants and ask if the medicine is helping. Tell your provider about any side effects or problems you have with your medicine. The type or amount of medicine may need to be changed. Write down your questions so you remember to ask them during your visits.  For more information or support:   [employer] [facility] [employer]  [address]  [place] [address]  Phone: 8- 405 - [phone]  Phone: 8- 322 - [phone]  Web Address: http://www.[employer].org  [facility] Su[facility] and [facility]  [address]  [place] [address]  Phone: 0- 492 - 350  Web Address: http://www.suicidepreventionlifeline.org OR https://[employer].org/chat/    © Copyright [employer] 2023  Information is for End User's use only and may not be sold, redistributed or otherwise used for commercial purposes.  The above information is an  only. It is not intended as medical advice for individual conditions or treatments. Talk to your doctor, nurse or pharmacist before following any medical regimen to see if it is safe and effective for you.

## 2024-05-01 DIAGNOSIS — E06.3 HYPOTHYROIDISM DUE TO HASHIMOTO'S THYROIDITIS: ICD-10-CM

## 2024-05-01 DIAGNOSIS — E03.8 HYPOTHYROIDISM DUE TO HASHIMOTO'S THYROIDITIS: ICD-10-CM

## 2024-05-01 RX ORDER — LEVOTHYROXINE SODIUM 150 MCG
150 TABLET ORAL DAILY
Qty: 30 TABLET | Refills: 2 | Status: SHIPPED | OUTPATIENT
Start: 2024-05-01 | End: 2024-05-02

## 2024-05-01 NOTE — PROGRESS NOTES
Established Patient Progress Note    CC: Follow up for hypothyroidism due to Hashimoto's thyroiditis and thyroid nodule    Impression & Plan:    Problem List Items Addressed This Visit       Hashimoto's thyroiditis    Relevant Medications    Synthroid 150 MCG tablet    Other Relevant Orders    TSH + Free T4    TSH + Free T4    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     Most recent TSH 0.6. Patient feels her best when TSH is 1-2.    Recommend:  - decrease Synthroid to 1/2 tablet on Sunday, continue with 1 tablet Mon-Sat  - repeat lab work in 6 weeks         Relevant Medications    Synthroid 150 MCG tablet    Thyroid nodule     No prior records available for review. Patient does not currently present with complaints but reports having hoarse voice in the past. Will obtain updated record so we have in our system.  - US thyroid ordered         Relevant Medications    Synthroid 150 MCG tablet    Other Relevant Orders    US thyroid       Orders Placed This Encounter   Procedures    US thyroid     Standing Status:   Future     Standing Expiration Date:   5/2/2028     Scheduling Instructions:      No prep required.        Please bring your insurance cards and a form of photo ID.  Bring your order/script for the test if from a non - [facility] provider.        Arrive 15 minutes prior to your appointment time to register.            To schedule this appointment, please contact Central Scheduling at [phone].          TSH + Free T4     Standing Status:   Future     Standing Expiration Date:   5/2/2025    TSH + Free T4     Standing Status:   Future     Standing Expiration Date:   5/2/2025       History of Present Illness:   Marisa Early is a 43 y.o. female with a history of hypothyroidism due to Hashimoto's thyroiditis, thyroid nodule, vitamin D deficiency and iron deficiency.  She is currently prescribed 150 mcg of Synthroid. She recently started Wellbutrin for her depression. She is also planning to  start tripeptide for weight loss through an outside provider. She has no other complaints today as far as her health goes.     Thyroid nodule:  She had a thyroid ultrasound completed in  while she was living in [place].  This did not need a biopsy.  She denies any family history of thyroid cancer or exposure to radiation.  She denies any compressive or obstructive symptoms. No prior records available for review.       Patient Active Problem List   Diagnosis    Alopecia areata    Nutritional anemia    Hashimoto's thyroiditis    Vitamin D deficiency    Encounter for screening mammogram for breast cancer    Abnormal mammogram    BV (bacterial vaginosis)    Seborrheic eczema    Anxiety    Hypothyroidism due to Hashimoto's thyroiditis    Less than 8 weeks gestation of pregnancy    Thyroid nodule    Screening for HIV (human immunodeficiency virus)    Infertility, female    Encounter for annual physical exam    Other fatigue    Obesity, Class II, BMI 35-39.9    Iron deficiency anemia    Drug-induced constipation    Nutritional deficiency      Past Medical History:   Diagnosis Date    BV (bacterial vaginosis)     Hashimoto's disease     Infertility, female     Iron deficiency anemia     Vitamin D deficiency       Past Surgical History:   Procedure Laterality Date     SECTION      WISDOM TOOTH EXTRACTION        Family History   Problem Relation Age of Onset    Thyroid disease unspecified Mother     Hyperlipidemia Father     Hypertension Father     Diabetes type II Paternal Grandfather      Social History     Tobacco Use    Smoking status: Never     Passive exposure: Never    Smokeless tobacco: Never   Substance Use Topics    Alcohol use: Not Currently     Comment: socially     Allergies   Allergen Reactions    [place] (Diagnostic) - Food Allergy Itching and Other (See Comments)         Current Outpatient Medications:     amoxicillin-clavulanate (AUGMENTIN) 875-125 mg per tablet, Take 1 tablet by mouth every 12  "(twelve) hours for 10 days, Disp: 20 tablet, Rfl: 0    buPROPion (WELLBUTRIN XL) 150 mg 24 hr tablet, Take 1 tablet (150 mg total) by mouth every morning, Disp: 30 tablet, Rfl: 1    Cholecalciferol (Vitamin D3 Super Strength) 50 MCG (2000 UT) TABS, Take by mouth, Disp: , Rfl:     Cholecalciferol (Vitamin D3) 1.25 MG (75401 UT) TABS, once a week, Disp: , Rfl:     docusate sodium (COLACE) 50 mg capsule, Take 1 capsule (50 mg total) by mouth in the morning, Disp: 90 capsule, Rfl: 0    fluconazole (DIFLUCAN) 200 mg tablet, Take by mouth as needed, Disp: , Rfl:     Iron-Vitamin C (Vitron-C)  MG TABS, Take  mg by mouth every other day, Disp: 30 tablet, Rfl: 3    metroNIDAZOLE (METROGEL) 0.75 % vaginal gel, Insert 1 Application into the vagina as needed (discharge) use once daily during flares, Disp: 70 g, Rfl: 2    neomycin-polymyxin-hydrocortisone (CORTISPORIN) otic solution, Administer 3 drops into the left ear every 8 (eight) hours, Disp: 10 mL, Rfl: 1    ofloxacin (FLOXIN) 0.3 % otic solution, , Disp: , Rfl:     Synthroid 150 MCG tablet, Take 1 tablet Mon-Sat and 1/2 tablet on Sun, Disp: 30 tablet, Rfl: 2    acetic acid (VOSOL) 2 % otic solution, , Disp: , Rfl:     Review of Systems   Constitutional:  Negative for chills and fever.   HENT:  Negative for ear pain, sore throat, trouble swallowing and voice change.    Eyes:  Negative for pain and visual disturbance.   Respiratory:  Negative for cough and shortness of breath.    Cardiovascular:  Negative for chest pain and palpitations.   Gastrointestinal:  Negative for abdominal pain and vomiting.   Genitourinary:  Negative for dysuria and hematuria.   Musculoskeletal:  Negative for arthralgias and back pain.   Skin:  Negative for color change and rash.   Neurological:  Negative for seizures and syncope.   All other systems reviewed and are negative.      Physical Exam:  Body mass index is 40.65 kg/m².  /80   Pulse 68   Ht 5' 3\" (1.6 m)   Wt 104 kg " "(229 lb 8 oz)   SpO2 99%   BMI 40.65 kg/m²    Wt Readings from Last 3 Encounters:   05/02/24 104 kg (229 lb 8 oz)   04/26/24 105 kg (230 lb 6.4 oz)   03/20/24 103 kg (226 lb 14.4 oz)       Physical Exam  Vitals reviewed.   Constitutional:       Appearance: Normal appearance.   HENT:      Head: Normocephalic and atraumatic.   Cardiovascular:      Rate and Rhythm: Normal rate.   Pulmonary:      Effort: Pulmonary effort is normal.   Musculoskeletal:      Cervical back: Neck supple. No tenderness.   Lymphadenopathy:      Cervical: No cervical adenopathy.   Neurological:      Mental Status: She is alert and oriented to person, place, and time.   Psychiatric:         Mood and Affect: Mood normal.         Behavior: Behavior normal.         Labs:   No results found for: \"HGBA1C\"  Lab Results   Component Value Date    CREATININE 0.93 05/08/2023    BUN 9 05/08/2023    K 3.4 (L) 05/08/2023     05/08/2023    CO2 25 05/08/2023     eGFR   Date Value Ref Range Status   05/08/2023 76 ml/min/1.73sq m Final     No results found for: \"CHOL\", \"HDL\", \"LDL\", \"TRIG\", \"CHOLHDL\"  No results found for: \"ALT\", \"AST\", \"GGT\", \"ALKPHOS\", \"BILITOT\"  No results found for: \"[id number]\"  Lab Results   Component Value Date    FREET4 1.26 04/24/2024         There are no Patient Instructions on file for this visit.      Discussed with the patient and all questioned fully answered. She will call me if any problems arise.    "

## 2024-05-02 ENCOUNTER — OFFICE VISIT (OUTPATIENT)
Dept: ENDOCRINOLOGY | Facility: CLINIC | Age: 44
End: 2024-05-02
Payer: COMMERCIAL

## 2024-05-02 VITALS
DIASTOLIC BLOOD PRESSURE: 80 MMHG | WEIGHT: 229.5 LBS | HEIGHT: 63 IN | HEART RATE: 68 BPM | OXYGEN SATURATION: 99 % | SYSTOLIC BLOOD PRESSURE: 122 MMHG | BODY MASS INDEX: 40.66 KG/M2

## 2024-05-02 DIAGNOSIS — E06.3 HYPOTHYROIDISM DUE TO HASHIMOTO'S THYROIDITIS: Primary | ICD-10-CM

## 2024-05-02 DIAGNOSIS — E04.1 THYROID NODULE: ICD-10-CM

## 2024-05-02 DIAGNOSIS — E03.8 HYPOTHYROIDISM DUE TO HASHIMOTO'S THYROIDITIS: Primary | ICD-10-CM

## 2024-05-02 DIAGNOSIS — E06.3 HASHIMOTO'S THYROIDITIS: ICD-10-CM

## 2024-05-02 PROCEDURE — 99214 OFFICE O/P EST MOD 30 MIN: CPT

## 2024-05-02 RX ORDER — LEVOTHYROXINE SODIUM 150 MCG
TABLET ORAL
Qty: 30 TABLET | Refills: 2 | Status: SHIPPED | OUTPATIENT
Start: 2024-05-02

## 2024-05-02 NOTE — ASSESSMENT & PLAN NOTE
No prior records available for review. Patient does not currently present with complaints but reports having hoarse voice in the past. Will obtain updated record so we have in our system.  - US thyroid ordered

## 2024-05-02 NOTE — ASSESSMENT & PLAN NOTE
Most recent TSH 0.6. Patient feels her best when TSH is 1-2.    Recommend:  - decrease Synthroid to 1/2 tablet on Sunday, continue with 1 tablet Mon-Sat  - repeat lab work in 6 weeks

## 2024-06-14 DIAGNOSIS — F32.9 CURRENT EPISODE OF MAJOR DEPRESSIVE DISORDER WITHOUT PRIOR EPISODE, UNSPECIFIED DEPRESSION EPISODE SEVERITY: ICD-10-CM

## 2024-06-14 RX ORDER — BUPROPION HYDROCHLORIDE 150 MG/1
150 TABLET ORAL DAILY
Qty: 30 TABLET | Refills: 0 | Status: SHIPPED | OUTPATIENT
Start: 2024-06-14

## 2024-07-31 ENCOUNTER — OFFICE VISIT (OUTPATIENT)
Dept: FAMILY MEDICINE CLINIC | Facility: CLINIC | Age: 44
End: 2024-07-31
Payer: COMMERCIAL

## 2024-07-31 VITALS
BODY MASS INDEX: 39.97 KG/M2 | TEMPERATURE: 97.3 F | OXYGEN SATURATION: 98 % | RESPIRATION RATE: 16 BRPM | HEIGHT: 63 IN | DIASTOLIC BLOOD PRESSURE: 80 MMHG | WEIGHT: 225.6 LBS | HEART RATE: 72 BPM | SYSTOLIC BLOOD PRESSURE: 114 MMHG

## 2024-07-31 DIAGNOSIS — Z12.4 CERVICAL CANCER SCREENING: Primary | ICD-10-CM

## 2024-07-31 DIAGNOSIS — Z11.3 SCREEN FOR STD (SEXUALLY TRANSMITTED DISEASE): ICD-10-CM

## 2024-07-31 DIAGNOSIS — F32.9 CURRENT EPISODE OF MAJOR DEPRESSIVE DISORDER WITHOUT PRIOR EPISODE, UNSPECIFIED DEPRESSION EPISODE SEVERITY: ICD-10-CM

## 2024-07-31 PROCEDURE — G0476 HPV COMBO ASSAY CA SCREEN: HCPCS | Performed by: FAMILY MEDICINE

## 2024-07-31 PROCEDURE — S0612 ANNUAL GYNECOLOGICAL EXAMINA: HCPCS | Performed by: FAMILY MEDICINE

## 2024-07-31 PROCEDURE — 87491 CHLMYD TRACH DNA AMP PROBE: CPT | Performed by: FAMILY MEDICINE

## 2024-07-31 PROCEDURE — G0145 SCR C/V CYTO,THINLAYER,RESCR: HCPCS | Performed by: FAMILY MEDICINE

## 2024-07-31 PROCEDURE — 87591 N.GONORRHOEAE DNA AMP PROB: CPT | Performed by: FAMILY MEDICINE

## 2024-07-31 PROCEDURE — 87480 CANDIDA DNA DIR PROBE: CPT | Performed by: FAMILY MEDICINE

## 2024-07-31 PROCEDURE — 87660 TRICHOMONAS VAGIN DIR PROBE: CPT | Performed by: FAMILY MEDICINE

## 2024-07-31 PROCEDURE — 99214 OFFICE O/P EST MOD 30 MIN: CPT | Performed by: FAMILY MEDICINE

## 2024-07-31 PROCEDURE — 87510 GARDNER VAG DNA DIR PROBE: CPT | Performed by: FAMILY MEDICINE

## 2024-07-31 RX ORDER — ESCITALOPRAM OXALATE 5 MG/1
5 TABLET ORAL DAILY
Qty: 30 TABLET | Refills: 1 | Status: SHIPPED | OUTPATIENT
Start: 2024-07-31

## 2024-07-31 RX ORDER — BUPROPION HYDROCHLORIDE 300 MG/1
300 TABLET ORAL EVERY MORNING
Qty: 30 TABLET | Refills: 1 | Status: SHIPPED | OUTPATIENT
Start: 2024-07-31

## 2024-07-31 RX ORDER — ESCITALOPRAM OXALATE 5 MG/1
5 TABLET ORAL DAILY
Qty: 30 TABLET | Refills: 1 | Status: SHIPPED | OUTPATIENT
Start: 2024-07-31 | End: 2024-07-31

## 2024-07-31 RX ORDER — DEXTROAMPHETAMINE SACCHARATE, AMPHETAMINE ASPARTATE MONOHYDRATE, DEXTROAMPHETAMINE SULFATE AND AMPHETAMINE SULFATE 2.5; 2.5; 2.5; 2.5 MG/1; MG/1; MG/1; MG/1
10 CAPSULE, EXTENDED RELEASE ORAL EVERY MORNING
Qty: 10 CAPSULE | Refills: 0 | Status: SHIPPED | OUTPATIENT
Start: 2024-07-31

## 2024-07-31 RX ORDER — BUPROPION HYDROCHLORIDE 300 MG/1
300 TABLET ORAL DAILY
Qty: 30 TABLET | Refills: 1 | Status: SHIPPED | OUTPATIENT
Start: 2024-07-31 | End: 2024-07-31

## 2024-08-01 LAB
CANDIDA RRNA VAG QL PROBE: NOT DETECTED
G VAGINALIS RRNA GENITAL QL PROBE: NOT DETECTED
HPV HR 12 DNA CVX QL NAA+PROBE: NEGATIVE
HPV16 DNA CVX QL NAA+PROBE: NEGATIVE
HPV18 DNA CVX QL NAA+PROBE: NEGATIVE
T VAGINALIS RRNA GENITAL QL PROBE: NOT DETECTED

## 2024-08-02 LAB
C TRACH DNA SPEC QL NAA+PROBE: NEGATIVE
N GONORRHOEA DNA SPEC QL NAA+PROBE: NEGATIVE

## 2024-08-02 NOTE — PROGRESS NOTES
Ambulatory Visit  Name: Marisa Early      : 1980      MRN: [id number]  Encounter Provider: Marian Chester MD  Encounter Date: 2024   Encounter department: [facility]    Assessment & Plan   1. Cervical cancer screening  Comments:  PAP completed today. STD screening also performed as requested  Orders:  -     Liquid-based pap, screening  -     HPV High Risk  2. Current episode of major depressive disorder without prior episode, unspecified depression episode severity  -     amphetamine-dextroamphetamine (ADDERALL XR, 10MG,) 10 MG 24 hr capsule; Take 1 capsule (10 mg total) by mouth every morning Max Daily Amount: 10 mg  -     buPROPion (WELLBUTRIN XL) 300 mg 24 hr tablet; Take 1 tablet (300 mg total) by mouth every morning  -     escitalopram (LEXAPRO) 5 mg tablet; Take 1 tablet (5 mg total) by mouth daily  3. Current episode of major depressive disorder without prior episode, unspecified depression episode severity  Comments:  We discussed medical managment of depression. Prescribed wellbutrin 150 mg daily. Worked initially but the affects waned. Will increase dose to 300 and add lexapro 5 mg. Adderal added to help with concentration. Aware she will need formal neuropsychiatric testing to confirm diagnosis when she moves to [place]. Aware of S/e and call precautions reviewed. PDMP queried.  Orders:  -     amphetamine-dextroamphetamine (ADDERALL XR, 10MG,) 10 MG 24 hr capsule; Take 1 capsule (10 mg total) by mouth every morning Max Daily Amount: 10 mg  -     buPROPion (WELLBUTRIN XL) 300 mg 24 hr tablet; Take 1 tablet (300 mg total) by mouth every morning  -     escitalopram (LEXAPRO) 5 mg tablet; Take 1 tablet (5 mg total) by mouth daily  4. Screen for STD (sexually transmitted disease)  -     Chlamydia/GC amplified DNA by PCR  -     Vaginosis DNA Probe; Future  -     Vaginosis DNA Probe       History of Present Illness     Seen today for follow up and pap  States Wellbutrin worked  initially but wore off after a few weeks  She self discontinued the medication 1 week ago   Plans to move back to [facility] later this month.   Able to find a job in [place] with the same company she is currently with  States being in [place] and potentially seeing her ex makes healing difficult  She is requesting STD screening as well  Vaginal bleeding has resolved          Review of Systems   Genitourinary:  Negative for genital sores, pelvic pain, urgency, vaginal bleeding, vaginal discharge and vaginal pain.   Psychiatric/Behavioral:  Positive for decreased concentration and dysphoric mood. Negative for sleep disturbance. The patient is nervous/anxious.      Medical History Reviewed by provider this encounter:       Past Medical History   Past Medical History:   Diagnosis Date    BV (bacterial vaginosis)     Hashimoto's disease     Infertility, female     Iron deficiency anemia     Vitamin D deficiency      Past Surgical History:   Procedure Laterality Date     SECTION      WISDOM TOOTH EXTRACTION       Family History   Problem Relation Age of Onset    Thyroid disease unspecified Mother     Hyperlipidemia Father     Hypertension Father     Diabetes type II Paternal Grandfather      Current Outpatient Medications on File Prior to Visit   Medication Sig Dispense Refill    Cholecalciferol (Vitamin D3 Super Strength) 50 MCG (2000 UT) TABS Take by mouth      Cholecalciferol (Vitamin D3) 1.25 MG (58089 UT) TABS once a week      fluconazole (DIFLUCAN) 200 mg tablet Take by mouth as needed      Iron-Vitamin C (Vitron-C)  MG TABS Take  mg by mouth every other day 30 tablet 3    metroNIDAZOLE (METROGEL) 0.75 % vaginal gel Insert 1 Application into the vagina as needed (discharge) use once daily during flares 70 g 2    Synthroid 150 MCG tablet Take 1 tablet Mon-Sat and 1/2 tablet on Sun 30 tablet 2    acetic acid (VOSOL) 2 % otic solution  (Patient not taking: Reported on 2024)      docusate sodium  "(COLACE) 50 mg capsule Take 1 capsule (50 mg total) by mouth in the morning (Patient not taking: Reported on 7/31/2024) 90 capsule 0     No current facility-administered medications on file prior to visit.     Allergies   Allergen Reactions    [place] (Diagnostic) - Food Allergy Itching and Other (See Comments)      Current Outpatient Medications on File Prior to Visit   Medication Sig Dispense Refill    Cholecalciferol (Vitamin D3 Super Strength) 50 MCG (2000 UT) TABS Take by mouth      Cholecalciferol (Vitamin D3) 1.25 MG (11985 UT) TABS once a week      fluconazole (DIFLUCAN) 200 mg tablet Take by mouth as needed      Iron-Vitamin C (Vitron-C)  MG TABS Take  mg by mouth every other day 30 tablet 3    metroNIDAZOLE (METROGEL) 0.75 % vaginal gel Insert 1 Application into the vagina as needed (discharge) use once daily during flares 70 g 2    Synthroid 150 MCG tablet Take 1 tablet Mon-Sat and 1/2 tablet on Sun 30 tablet 2    acetic acid (VOSOL) 2 % otic solution  (Patient not taking: Reported on 5/2/2024)      docusate sodium (COLACE) 50 mg capsule Take 1 capsule (50 mg total) by mouth in the morning (Patient not taking: Reported on 7/31/2024) 90 capsule 0     No current facility-administered medications on file prior to visit.      Social History     Tobacco Use    Smoking status: Never     Passive exposure: Never    Smokeless tobacco: Never   Vaping Use    Vaping status: Never Used   Substance and Sexual Activity    Alcohol use: Not Currently     Comment: socially    Drug use: Never    Sexual activity: Yes     Partners: Male     Birth control/protection: None     Objective     /80 (BP Location: Left arm, Patient Position: Sitting, Cuff Size: Large)   Pulse 72   Temp (!) 97.3 °F (36.3 °C) (Tympanic)   Resp 16   Ht 5' 3\" (1.6 m)   Wt 102 kg (225 lb 9.6 oz)   SpO2 98%   BMI 39.96 kg/m²     Physical Exam  Exam conducted with a chaperone present (Marcella BROWN).   Constitutional:       General: She " is not in acute distress.     Appearance: Normal appearance. She is not ill-appearing or toxic-appearing.   HENT:      Head: Normocephalic and atraumatic.   Genitourinary:     Labia:         Right: No rash or lesion.         Left: No rash or lesion.       Urethra: No prolapse or urethral lesion.      Vagina: Vaginal discharge present.      Cervix: Discharge (leukorrhea) and cervical bleeding present. No friability, lesion or erythema.      Uterus: Not deviated.       Adnexa: Right adnexa normal and left adnexa normal.   Musculoskeletal:      Right lower leg: No edema.      Left lower leg: No edema.   Skin:     General: Skin is warm.   Neurological:      Mental Status: She is alert and oriented to person, place, and time.

## 2024-08-06 LAB
LAB AP GYN PRIMARY INTERPRETATION: NORMAL
Lab: NORMAL

## 2024-09-11 DIAGNOSIS — F32.9 CURRENT EPISODE OF MAJOR DEPRESSIVE DISORDER WITHOUT PRIOR EPISODE, UNSPECIFIED DEPRESSION EPISODE SEVERITY: ICD-10-CM

## 2024-09-11 RX ORDER — DEXTROAMPHETAMINE SACCHARATE, AMPHETAMINE ASPARTATE MONOHYDRATE, DEXTROAMPHETAMINE SULFATE AND AMPHETAMINE SULFATE 2.5; 2.5; 2.5; 2.5 MG/1; MG/1; MG/1; MG/1
10 CAPSULE, EXTENDED RELEASE ORAL EVERY MORNING
Qty: 10 CAPSULE | Refills: 0 | Status: SHIPPED | OUTPATIENT
Start: 2024-09-11

## 2024-09-11 NOTE — TELEPHONE ENCOUNTER
Medication:  PDMP   07/31/2024 07/31/2024 Mixed Amphetamine Salts (Capsule, Extended Release) 10.0 10 10 MG NA SAMREEN RIGGS     Active agreement on file -No